# Patient Record
Sex: MALE | Race: WHITE | Employment: UNEMPLOYED | URBAN - METROPOLITAN AREA
[De-identification: names, ages, dates, MRNs, and addresses within clinical notes are randomized per-mention and may not be internally consistent; named-entity substitution may affect disease eponyms.]

---

## 2017-11-27 PROBLEM — K62.5 RECTAL BLEEDING: Status: ACTIVE | Noted: 2017-11-27

## 2018-03-23 ENCOUNTER — OFFICE VISIT (OUTPATIENT)
Dept: FAMILY MEDICINE CLINIC | Age: 23
End: 2018-03-23
Payer: COMMERCIAL

## 2018-03-23 VITALS
BODY MASS INDEX: 31.15 KG/M2 | SYSTOLIC BLOOD PRESSURE: 119 MMHG | RESPIRATION RATE: 18 BRPM | WEIGHT: 230 LBS | OXYGEN SATURATION: 98 % | HEART RATE: 85 BPM | HEIGHT: 72 IN | DIASTOLIC BLOOD PRESSURE: 72 MMHG

## 2018-03-23 DIAGNOSIS — H53.451 PERIPHERAL VISION LOSS, RIGHT: ICD-10-CM

## 2018-03-23 DIAGNOSIS — H10.31 ACUTE CONJUNCTIVITIS OF RIGHT EYE, UNSPECIFIED ACUTE CONJUNCTIVITIS TYPE: Primary | ICD-10-CM

## 2018-03-23 DIAGNOSIS — H57.89 RED EYE: ICD-10-CM

## 2018-03-23 DIAGNOSIS — H53.8 BLURRED VISION, RIGHT EYE: ICD-10-CM

## 2018-03-23 PROCEDURE — G8482 FLU IMMUNIZE ORDER/ADMIN: HCPCS | Performed by: FAMILY MEDICINE

## 2018-03-23 PROCEDURE — G8427 DOCREV CUR MEDS BY ELIG CLIN: HCPCS | Performed by: FAMILY MEDICINE

## 2018-03-23 PROCEDURE — G8417 CALC BMI ABV UP PARAM F/U: HCPCS | Performed by: FAMILY MEDICINE

## 2018-03-23 PROCEDURE — 99213 OFFICE O/P EST LOW 20 MIN: CPT | Performed by: FAMILY MEDICINE

## 2018-03-23 PROCEDURE — 1036F TOBACCO NON-USER: CPT | Performed by: FAMILY MEDICINE

## 2018-03-23 RX ORDER — SULFACETAMIDE SODIUM 100 MG/ML
SOLUTION/ DROPS OPHTHALMIC
Refills: 0 | COMMUNITY
Start: 2018-03-09 | End: 2018-03-23

## 2018-03-23 NOTE — PROGRESS NOTES
Fundoscopic exam:       The right eye shows no arteriolar narrowing. The left eye shows no arteriolar narrowing. Slit lamp exam:       The right eye shows fluorescein uptake. The right eye shows no corneal abrasion, no corneal ulcer and no anterior chamber bulge. The left eye shows no corneal abrasion and no corneal ulcer. Anterior chamber in right eye seemed hazy. Patient does complain of peripheral vision loss. No foreign body noted. Neck: Normal range of motion. Neck supple. Cardiovascular: Normal rate, regular rhythm and normal heart sounds. No murmur heard. Pulmonary/Chest: Effort normal and breath sounds normal. No respiratory distress. He has no wheezes. He has no rales. Abdominal: Soft. Bowel sounds are normal. There is no tenderness. There is no rebound and no guarding. Musculoskeletal: He exhibits no edema, tenderness or deformity. Neurological: He is alert and oriented to person, place, and time. No cranial nerve deficit. Coordination normal.   Skin: Skin is warm and dry. Nursing note and vitals reviewed. Assessment & Plan:  1. Acute conjunctivitis of right eye, unspecified acute conjunctivitis type  Acute  Non-improving  Discontinue sulfacetamide ointment  Only use artificial tears  [facility] I [facility] get patient in appointment established for tomorrow morning at 8:30  Patient instructed to call and tell me how it goes or if anything worsens  Close monitoring  - Christa Stubbs MD (ACE)  - artificial tears (ARTIFICIAL TEARS) [address] [place] [address]; Place . 5 inch of ointment in affected eyelid at night before bed. Dispense: 1 Tube; Refill: 0    2. Blurred vision, right eye  As above  - Christa Stubbs MD (ACE)  - artificial tears (ARTIFICIAL TEARS) [address] [place] [address]; Place . 5 inch of ointment in affected eyelid at night before bed. Dispense: 1 Tube; Refill: 0    3.  Red eye  As above  - [facility]

## 2018-03-25 ASSESSMENT — ENCOUNTER SYMPTOMS
WHEEZING: 0
PHOTOPHOBIA: 1
EYE PAIN: 0
DIARRHEA: 0
COUGH: 0
NAUSEA: 0
EYE ITCHING: 1
SORE THROAT: 0
ABDOMINAL PAIN: 0
SHORTNESS OF BREATH: 0
EYE REDNESS: 1
EYE DISCHARGE: 0
BACK PAIN: 0
VOMITING: 0
SINUS PRESSURE: 0

## 2018-04-02 ENCOUNTER — OFFICE VISIT (OUTPATIENT)
Dept: FAMILY MEDICINE CLINIC | Age: 23
End: 2018-04-02
Payer: COMMERCIAL

## 2018-04-02 VITALS
TEMPERATURE: 98.3 F | DIASTOLIC BLOOD PRESSURE: 78 MMHG | OXYGEN SATURATION: 97 % | BODY MASS INDEX: 31.56 KG/M2 | HEIGHT: 72 IN | RESPIRATION RATE: 16 BRPM | WEIGHT: 233 LBS | HEART RATE: 89 BPM | SYSTOLIC BLOOD PRESSURE: 110 MMHG

## 2018-04-02 DIAGNOSIS — R21 RASH OF FACE: ICD-10-CM

## 2018-04-02 DIAGNOSIS — H10.31 ACUTE CONJUNCTIVITIS OF RIGHT EYE, UNSPECIFIED ACUTE CONJUNCTIVITIS TYPE: Primary | ICD-10-CM

## 2018-04-02 PROCEDURE — 1036F TOBACCO NON-USER: CPT | Performed by: FAMILY MEDICINE

## 2018-04-02 PROCEDURE — G8417 CALC BMI ABV UP PARAM F/U: HCPCS | Performed by: FAMILY MEDICINE

## 2018-04-02 PROCEDURE — 99213 OFFICE O/P EST LOW 20 MIN: CPT | Performed by: FAMILY MEDICINE

## 2018-04-02 PROCEDURE — G8427 DOCREV CUR MEDS BY ELIG CLIN: HCPCS | Performed by: FAMILY MEDICINE

## 2018-04-04 ASSESSMENT — ENCOUNTER SYMPTOMS
ABDOMINAL PAIN: 0
SHORTNESS OF BREATH: 0
EYE REDNESS: 0
VOMITING: 0
WHEEZING: 0
EYE PAIN: 0
SINUS PRESSURE: 0
SORE THROAT: 0
NAUSEA: 0
EYE DISCHARGE: 0
DIARRHEA: 0
COUGH: 0
BACK PAIN: 0

## 2018-05-07 ENCOUNTER — HOSPITAL ENCOUNTER (OUTPATIENT)
Age: 23
Discharge: HOME | End: 2018-05-07
Payer: COMMERCIAL

## 2018-05-07 ENCOUNTER — OFFICE VISIT (OUTPATIENT)
Dept: FAMILY MEDICINE CLINIC | Age: 23
End: 2018-05-07
Payer: COMMERCIAL

## 2018-05-07 VITALS
SYSTOLIC BLOOD PRESSURE: 124 MMHG | HEIGHT: 69 IN | WEIGHT: 230 LBS | DIASTOLIC BLOOD PRESSURE: 70 MMHG | RESPIRATION RATE: 18 BRPM | HEART RATE: 92 BPM | OXYGEN SATURATION: 98 % | BODY MASS INDEX: 34.07 KG/M2

## 2018-05-07 DIAGNOSIS — H20.9 IRITIS OF RIGHT EYE: ICD-10-CM

## 2018-05-07 DIAGNOSIS — H20.9 IRITIS OF RIGHT EYE: Primary | ICD-10-CM

## 2018-05-07 PROBLEM — K62.5 RECTAL BLEEDING: Status: RESOLVED | Noted: 2017-11-27 | Resolved: 2018-05-07

## 2018-05-07 LAB
C-REACTIVE PROTEIN: <0.1 MG/DL (ref 0–0.4)
SEDIMENTATION RATE, ERYTHROCYTE: 7 MM/HR (ref 0–15)

## 2018-05-07 PROCEDURE — G8417 CALC BMI ABV UP PARAM F/U: HCPCS | Performed by: FAMILY MEDICINE

## 2018-05-07 PROCEDURE — 1036F TOBACCO NON-USER: CPT | Performed by: FAMILY MEDICINE

## 2018-05-07 PROCEDURE — 99213 OFFICE O/P EST LOW 20 MIN: CPT | Performed by: FAMILY MEDICINE

## 2018-05-07 PROCEDURE — 86592 SYPHILIS TEST NON-TREP QUAL: CPT

## 2018-05-07 PROCEDURE — 86038 ANTINUCLEAR ANTIBODIES: CPT

## 2018-05-07 PROCEDURE — 85651 RBC SED RATE NONAUTOMATED: CPT

## 2018-05-07 PROCEDURE — 36415 COLL VENOUS BLD VENIPUNCTURE: CPT

## 2018-05-07 PROCEDURE — 86140 C-REACTIVE PROTEIN: CPT

## 2018-05-07 PROCEDURE — 86812 HLA TYPING A B OR C: CPT

## 2018-05-07 PROCEDURE — G8427 DOCREV CUR MEDS BY ELIG CLIN: HCPCS | Performed by: FAMILY MEDICINE

## 2018-05-07 ASSESSMENT — ENCOUNTER SYMPTOMS
PHOTOPHOBIA: 1
EYE PAIN: 1
COUGH: 0
EYE REDNESS: 1
ABDOMINAL PAIN: 0
SORE THROAT: 0
VOMITING: 0
NAUSEA: 0
SHORTNESS OF BREATH: 0
WHEEZING: 0
EYE DISCHARGE: 0
SINUS PRESSURE: 0
BACK PAIN: 0
DIARRHEA: 0

## 2018-05-08 LAB — RPR: NORMAL

## 2018-05-09 LAB
ANTI-NUCLEAR ANTIBODY (ANA): NEGATIVE
HLA B27: NEGATIVE

## 2018-05-19 ENCOUNTER — HOSPITAL ENCOUNTER (OUTPATIENT)
Dept: MRI IMAGING | Age: 23
Discharge: HOME | End: 2018-05-21
Payer: COMMERCIAL

## 2018-05-19 DIAGNOSIS — H44.111 PANUVEITIS OF RIGHT EYE: ICD-10-CM

## 2018-05-19 PROCEDURE — 6360000004 HC RX CONTRAST MEDICATION: Performed by: RADIOLOGY

## 2018-05-19 PROCEDURE — A9579 GAD-BASE MR CONTRAST NOS,1ML: HCPCS | Performed by: RADIOLOGY

## 2018-05-19 PROCEDURE — 70543 MRI ORBT/FAC/NCK W/O &W/DYE: CPT

## 2018-05-19 RX ADMIN — GADOTERIDOL 20 ML: 279.3 INJECTION, SOLUTION INTRAVENOUS at 11:04

## 2018-05-23 ENCOUNTER — OFFICE VISIT (OUTPATIENT)
Dept: FAMILY MEDICINE CLINIC | Age: 23
End: 2018-05-23
Payer: COMMERCIAL

## 2018-05-23 VITALS
HEIGHT: 69 IN | SYSTOLIC BLOOD PRESSURE: 119 MMHG | RESPIRATION RATE: 16 BRPM | DIASTOLIC BLOOD PRESSURE: 64 MMHG | WEIGHT: 230 LBS | BODY MASS INDEX: 34.07 KG/M2 | HEART RATE: 71 BPM

## 2018-05-23 DIAGNOSIS — H20.9 IRITIS: Primary | ICD-10-CM

## 2018-05-23 DIAGNOSIS — R21 RASH: ICD-10-CM

## 2018-05-23 PROCEDURE — G8427 DOCREV CUR MEDS BY ELIG CLIN: HCPCS | Performed by: FAMILY MEDICINE

## 2018-05-23 PROCEDURE — G8417 CALC BMI ABV UP PARAM F/U: HCPCS | Performed by: FAMILY MEDICINE

## 2018-05-23 PROCEDURE — 99213 OFFICE O/P EST LOW 20 MIN: CPT | Performed by: FAMILY MEDICINE

## 2018-05-23 PROCEDURE — 1036F TOBACCO NON-USER: CPT | Performed by: FAMILY MEDICINE

## 2018-05-23 ASSESSMENT — ENCOUNTER SYMPTOMS
SINUS PRESSURE: 0
DIARRHEA: 0
EYE DISCHARGE: 0
SORE THROAT: 0
EYE PAIN: 0
WHEEZING: 0
BACK PAIN: 0
ABDOMINAL PAIN: 0
EYE REDNESS: 0
COUGH: 0
NAUSEA: 0
VOMITING: 0
SHORTNESS OF BREATH: 0

## 2018-08-06 RX ORDER — TRAZODONE HYDROCHLORIDE 50 MG/1
50 TABLET ORAL
Status: CANCELLED | OUTPATIENT
Start: 2018-08-06

## 2018-08-17 RX ORDER — TRAZODONE HYDROCHLORIDE 50 MG/1
50 TABLET ORAL NIGHTLY
Qty: 30 TABLET | Refills: 0 | Status: ON HOLD | OUTPATIENT
Start: 2018-08-17 | End: 2018-11-20 | Stop reason: HOSPADM

## 2018-08-23 ENCOUNTER — OFFICE VISIT (OUTPATIENT)
Dept: FAMILY MEDICINE CLINIC | Age: 23
End: 2018-08-23
Payer: COMMERCIAL

## 2018-08-23 VITALS
BODY MASS INDEX: 35.99 KG/M2 | HEART RATE: 88 BPM | RESPIRATION RATE: 18 BRPM | DIASTOLIC BLOOD PRESSURE: 68 MMHG | OXYGEN SATURATION: 97 % | HEIGHT: 69 IN | WEIGHT: 243 LBS | SYSTOLIC BLOOD PRESSURE: 122 MMHG

## 2018-08-23 DIAGNOSIS — K21.9 GASTROESOPHAGEAL REFLUX DISEASE, ESOPHAGITIS PRESENCE NOT SPECIFIED: ICD-10-CM

## 2018-08-23 DIAGNOSIS — R07.9 CHEST PAIN, UNSPECIFIED TYPE: Primary | ICD-10-CM

## 2018-08-23 PROCEDURE — G8417 CALC BMI ABV UP PARAM F/U: HCPCS | Performed by: FAMILY MEDICINE

## 2018-08-23 PROCEDURE — G8427 DOCREV CUR MEDS BY ELIG CLIN: HCPCS | Performed by: FAMILY MEDICINE

## 2018-08-23 PROCEDURE — 1036F TOBACCO NON-USER: CPT | Performed by: FAMILY MEDICINE

## 2018-08-23 PROCEDURE — 93000 ELECTROCARDIOGRAM COMPLETE: CPT | Performed by: FAMILY MEDICINE

## 2018-08-23 PROCEDURE — 99213 OFFICE O/P EST LOW 20 MIN: CPT | Performed by: FAMILY MEDICINE

## 2018-08-23 RX ORDER — VALACYCLOVIR HYDROCHLORIDE 1 G/1
1000 TABLET, FILM COATED ORAL DAILY
COMMUNITY
End: 2019-03-11

## 2018-08-23 RX ORDER — OMEPRAZOLE 20 MG/1
20 CAPSULE, DELAYED RELEASE ORAL
Qty: 30 CAPSULE | Refills: 0 | Status: SHIPPED | OUTPATIENT
Start: 2018-08-23 | End: 2018-11-15

## 2018-08-23 ASSESSMENT — ENCOUNTER SYMPTOMS
BACK PAIN: 0
VOMITING: 0
SORE THROAT: 0
SHORTNESS OF BREATH: 0
EYE DISCHARGE: 0
SINUS PRESSURE: 0
EYE REDNESS: 0
ABDOMINAL PAIN: 0
DIARRHEA: 0
COUGH: 0
WHEEZING: 0
NAUSEA: 0
EYE PAIN: 0

## 2018-08-23 NOTE — PROGRESS NOTES
breath sounds normal. No respiratory distress. He has no wheezes. He has no rales. He exhibits tenderness. Reproducible Left sided chest pain      Abdominal: Soft. Bowel sounds are normal. There is no tenderness. There is no rebound and no guarding. Musculoskeletal: He exhibits no edema, tenderness or deformity. Neurological: He is alert and oriented to person, place, and time. No cranial nerve deficit. Coordination normal.   Skin: Skin is warm and dry. Nursing note and vitals reviewed. Assessment & Plan:    1. Gastroesophageal reflux disease, esophagitis presence not specified  Not symptomatic at all   Discontinue Prilosec. Gave patient instructions on long taper of medication  - omeprazole (PRILOSEC) 20 MG delayed release capsule; Take 1 capsule by mouth daily (with breakfast)  Dispense: 30 capsule; Refill: 0    2. Chest pain, unspecified type  EKG done in office today   Making sure that chest pain is not cardiac origin   It does not show that it is cardiac in origin   Most likely musculoskeletal    - EKG 12 Lead; Future  - EKG 12 Lead    Dr. Sarah Glover --> spoke to opthalmalogic specialist from [address] [place] [address]. Poor prognosis. Has follow up with them in 2 weeks. As above. Call or go to ED immediately if symptoms worsen or persist.  Return in about 3 months (around 11/23/2018). , or sooner if necessary. Counseled regarding above diagnosis, including possible risks and complications,  especially if left uncontrolled. Counseled regarding the possible side effects, risks, benefits and alternatives to treatment; patient and/or guardian verbalizes understanding, agrees, feels comfortable with and wishes to proceed with above treatment plan. Advised patient to call with any new medication issues, and read all Rx info from pharmacy to assure aware of all possible risks and side effects of medication before taking.     Reviewed age and gender appropriate health screening exams and vaccinations. Advised patient regarding importance of keeping up with recommended health maintenance and to schedule as soon as possible if overdue, as this is important in assessing for undiagnosed pathology, especially cancer, as well as protecting against potentially harmful/life threatening disease. Patient and/or guardian verbalizes understanding and agrees with above counseling, assessment and plan. All questions answered.

## 2018-08-23 NOTE — PROGRESS NOTES
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 He has lost considerable vision on the right. ROS: Otherwise negative    Patient Active Problem List   Diagnosis    Iritis of right eye       Past medical, surgical, family and social history were reviewed, non-contributory, and unchanged unless otherwise stated. Objective:    /68   Pulse 88   Resp 18   Ht 5' 9\" (1.753 m)   Wt 243 lb (110.2 kg)   SpO2 97%   BMI 35.88 kg/m²     Exam is as noted by resident with the following changes, additions or corrections:      Assessment/Plan:        Latisha Bartholomew was seen today for conjunctivitis. Diagnoses and all orders for this visit:    Chest pain, unspecified type  -     EKG 12 Lead; Future  -     EKG 12 Lead    Gastroesophageal reflux disease, esophagitis presence not specified  -     omeprazole (PRILOSEC) 20 MG delayed release capsule; Take 1 capsule by mouth daily (with breakfast)    Other orders  -     EKG 12 Lead; Standing  -     EKG 12 Lead           Attending Physician Statement    I have reviewed the chart, including any radiology or labs. I have discussed the case, including pertinent history and exam findings with the resident. I agree with the assessment, plan and orders as documented by the resident. Please refer to the resident note for additional information.       Electronically signed by Jazmine Lindsay DO on 8/23/2018 at 8:40 PM

## 2018-08-24 ENCOUNTER — TELEPHONE (OUTPATIENT)
Dept: FAMILY MEDICINE CLINIC | Age: 23
End: 2018-08-24

## 2018-08-24 NOTE — TELEPHONE ENCOUNTER
Spoke to care provider. It is my recommendation that the patient call the eye specialist that they are seeing in [facility] clinic. The care provider understood and stated that they will go that way as soon as our conversation ended.      Thank you,      Robin Deleon  4:23 PM  08/24/18

## 2018-08-24 NOTE — TELEPHONE ENCOUNTER
Patients guardian called to let you know that Michael's good eye started itching and red.   He was not sure what you wanted him to do? thanks

## 2018-11-15 ENCOUNTER — OFFICE VISIT (OUTPATIENT)
Dept: FAMILY MEDICINE CLINIC | Age: 23
End: 2018-11-15
Payer: COMMERCIAL

## 2018-11-15 ENCOUNTER — HOSPITAL ENCOUNTER (INPATIENT)
Age: 23
LOS: 5 days | Discharge: HOME | DRG: 753 | End: 2018-11-20
Attending: EMERGENCY MEDICINE | Admitting: PSYCHIATRY & NEUROLOGY
Payer: COMMERCIAL

## 2018-11-15 VITALS
BODY MASS INDEX: 35.84 KG/M2 | SYSTOLIC BLOOD PRESSURE: 124 MMHG | DIASTOLIC BLOOD PRESSURE: 82 MMHG | WEIGHT: 242 LBS | HEIGHT: 69 IN | TEMPERATURE: 97.9 F | HEART RATE: 80 BPM | OXYGEN SATURATION: 97 %

## 2018-11-15 DIAGNOSIS — F32.A DEPRESSION WITH SUICIDAL IDEATION: Primary | ICD-10-CM

## 2018-11-15 DIAGNOSIS — R45.851 DEPRESSION WITH SUICIDAL IDEATION: Primary | ICD-10-CM

## 2018-11-15 DIAGNOSIS — R45.851 SUICIDAL INTENT: Primary | ICD-10-CM

## 2018-11-15 PROBLEM — F32.3 SEVERE MAJOR DEPRESSION WITH PSYCHOTIC FEATURES (HCC): Status: ACTIVE | Noted: 2018-11-15

## 2018-11-15 LAB
ACETAMINOPHEN LEVEL: <5 MCG/ML (ref 10–30)
ALBUMIN SERPL-MCNC: 4.2 G/DL (ref 3.5–5.2)
ALP BLD-CCNC: 84 U/L (ref 40–129)
ALT SERPL-CCNC: 7 U/L (ref 0–40)
AMPHETAMINE SCREEN, URINE: NOT DETECTED
ANION GAP SERPL CALCULATED.3IONS-SCNC: 13 MMOL/L (ref 7–16)
AST SERPL-CCNC: 12 U/L (ref 0–39)
BARBITURATE SCREEN URINE: NOT DETECTED
BASOPHILS ABSOLUTE: 0.02 E9/L (ref 0–0.2)
BASOPHILS RELATIVE PERCENT: 0.2 % (ref 0–2)
BENZODIAZEPINE SCREEN, URINE: NOT DETECTED
BILIRUB SERPL-MCNC: 0.3 MG/DL (ref 0–1.2)
BILIRUBIN URINE: NEGATIVE
BLOOD, URINE: NEGATIVE
BUN BLDV-MCNC: 11 MG/DL (ref 6–20)
CALCIUM SERPL-MCNC: 8.8 MG/DL (ref 8.6–10.2)
CANNABINOID SCREEN URINE: NOT DETECTED
CHLORIDE BLD-SCNC: 102 MMOL/L (ref 98–107)
CLARITY: CLEAR
CO2: 25 MMOL/L (ref 22–29)
COCAINE METABOLITE SCREEN URINE: NOT DETECTED
COLOR: YELLOW
CREAT SERPL-MCNC: 0.7 MG/DL (ref 0.7–1.2)
EKG ATRIAL RATE: 60 BPM
EKG P AXIS: 19 DEGREES
EKG P-R INTERVAL: 150 MS
EKG Q-T INTERVAL: 408 MS
EKG QRS DURATION: 98 MS
EKG QTC CALCULATION (BAZETT): 408 MS
EKG R AXIS: 45 DEGREES
EKG T AXIS: 35 DEGREES
EKG VENTRICULAR RATE: 60 BPM
EOSINOPHILS ABSOLUTE: 0.09 E9/L (ref 0.05–0.5)
EOSINOPHILS RELATIVE PERCENT: 1.1 % (ref 0–6)
ETHANOL: <10 MG/DL (ref 0–0.08)
GFR AFRICAN AMERICAN: >60
GFR NON-AFRICAN AMERICAN: >60 ML/MIN/1.73
GLUCOSE BLD-MCNC: 88 MG/DL (ref 74–99)
GLUCOSE URINE: NEGATIVE MG/DL
HCT VFR BLD CALC: 43.3 % (ref 37–54)
HEMOGLOBIN: 14.4 G/DL (ref 12.5–16.5)
IMMATURE GRANULOCYTES #: 0.04 E9/L
IMMATURE GRANULOCYTES %: 0.5 % (ref 0–5)
KETONES, URINE: NEGATIVE MG/DL
LEUKOCYTE ESTERASE, URINE: NEGATIVE
LYMPHOCYTES ABSOLUTE: 2.2 E9/L (ref 1.5–4)
LYMPHOCYTES RELATIVE PERCENT: 27 % (ref 20–42)
MCH RBC QN AUTO: 31.6 PG (ref 26–35)
MCHC RBC AUTO-ENTMCNC: 33.3 % (ref 32–34.5)
MCV RBC AUTO: 95 FL (ref 80–99.9)
METHADONE SCREEN, URINE: NOT DETECTED
MONOCYTES ABSOLUTE: 0.38 E9/L (ref 0.1–0.95)
MONOCYTES RELATIVE PERCENT: 4.7 % (ref 2–12)
NEUTROPHILS ABSOLUTE: 5.41 E9/L (ref 1.8–7.3)
NEUTROPHILS RELATIVE PERCENT: 66.5 % (ref 43–80)
NITRITE, URINE: NEGATIVE
OPIATE SCREEN URINE: NOT DETECTED
PDW BLD-RTO: 11.9 FL (ref 11.5–15)
PH UA: 7 (ref 5–9)
PHENCYCLIDINE SCREEN URINE: NOT DETECTED
PLATELET # BLD: 305 E9/L (ref 130–450)
PMV BLD AUTO: 8.8 FL (ref 7–12)
POTASSIUM SERPL-SCNC: 4.2 MMOL/L (ref 3.5–5)
PROPOXYPHENE SCREEN: NOT DETECTED
PROTEIN UA: NEGATIVE MG/DL
RBC # BLD: 4.56 E12/L (ref 3.8–5.8)
SALICYLATE, SERUM: <0.3 MG/DL (ref 0–30)
SODIUM BLD-SCNC: 140 MMOL/L (ref 132–146)
SPECIFIC GRAVITY UA: <=1.005 (ref 1–1.03)
TOTAL PROTEIN: 7.4 G/DL (ref 6.4–8.3)
TRICYCLIC ANTIDEPRESSANTS SCREEN SERUM: NEGATIVE NG/ML
UROBILINOGEN, URINE: 0.2 E.U./DL
VALPROIC ACID LEVEL: 66 MCG/ML (ref 50–100)
WBC # BLD: 8.1 E9/L (ref 4.5–11.5)

## 2018-11-15 PROCEDURE — 6370000000 HC RX 637 (ALT 250 FOR IP): Performed by: NURSE PRACTITIONER

## 2018-11-15 PROCEDURE — 80164 ASSAY DIPROPYLACETIC ACD TOT: CPT

## 2018-11-15 PROCEDURE — 99285 EMERGENCY DEPT VISIT HI MDM: CPT

## 2018-11-15 PROCEDURE — G0480 DRUG TEST DEF 1-7 CLASSES: HCPCS

## 2018-11-15 PROCEDURE — 81003 URINALYSIS AUTO W/O SCOPE: CPT

## 2018-11-15 PROCEDURE — 85025 COMPLETE CBC W/AUTO DIFF WBC: CPT

## 2018-11-15 PROCEDURE — 80053 COMPREHEN METABOLIC PANEL: CPT

## 2018-11-15 PROCEDURE — 80307 DRUG TEST PRSMV CHEM ANLYZR: CPT

## 2018-11-15 PROCEDURE — 99213 OFFICE O/P EST LOW 20 MIN: CPT | Performed by: FAMILY MEDICINE

## 2018-11-15 PROCEDURE — 1036F TOBACCO NON-USER: CPT | Performed by: FAMILY MEDICINE

## 2018-11-15 PROCEDURE — G8427 DOCREV CUR MEDS BY ELIG CLIN: HCPCS | Performed by: FAMILY MEDICINE

## 2018-11-15 PROCEDURE — 1240000000 HC EMOTIONAL WELLNESS R&B

## 2018-11-15 PROCEDURE — G8417 CALC BMI ABV UP PARAM F/U: HCPCS | Performed by: FAMILY MEDICINE

## 2018-11-15 PROCEDURE — G8484 FLU IMMUNIZE NO ADMIN: HCPCS | Performed by: FAMILY MEDICINE

## 2018-11-15 PROCEDURE — 36415 COLL VENOUS BLD VENIPUNCTURE: CPT

## 2018-11-15 RX ORDER — DIVALPROEX SODIUM 250 MG/1
250 TABLET, DELAYED RELEASE ORAL 2 TIMES DAILY
Status: DISCONTINUED | OUTPATIENT
Start: 2018-11-15 | End: 2018-11-19

## 2018-11-15 RX ORDER — MIRTAZAPINE 30 MG/1
30 TABLET, FILM COATED ORAL NIGHTLY
Status: ON HOLD | COMMUNITY
End: 2019-01-28 | Stop reason: HOSPADM

## 2018-11-15 RX ORDER — OLANZAPINE 10 MG/1
10 TABLET ORAL
Status: ACTIVE | OUTPATIENT
Start: 2018-11-15 | End: 2018-11-15

## 2018-11-15 RX ORDER — MINERAL OIL AND WHITE PETROLATUM 150; 830 MG/G; MG/G
OINTMENT OPHTHALMIC PRN
Status: DISCONTINUED | OUTPATIENT
Start: 2018-11-15 | End: 2018-11-20 | Stop reason: HOSPADM

## 2018-11-15 RX ORDER — MAGNESIUM HYDROXIDE/ALUMINUM HYDROXICE/SIMETHICONE 120; 1200; 1200 MG/30ML; MG/30ML; MG/30ML
30 SUSPENSION ORAL PRN
Status: DISCONTINUED | OUTPATIENT
Start: 2018-11-15 | End: 2018-11-20 | Stop reason: HOSPADM

## 2018-11-15 RX ORDER — HYDROXYZINE PAMOATE 50 MG/1
50 CAPSULE ORAL EVERY 6 HOURS PRN
Status: DISCONTINUED | OUTPATIENT
Start: 2018-11-15 | End: 2018-11-20 | Stop reason: HOSPADM

## 2018-11-15 RX ORDER — ARIPIPRAZOLE 15 MG/1
30 TABLET ORAL DAILY
Status: DISCONTINUED | OUTPATIENT
Start: 2018-11-15 | End: 2018-11-19

## 2018-11-15 RX ORDER — HALOPERIDOL 5 MG/ML
10 INJECTION INTRAMUSCULAR EVERY 6 HOURS PRN
Status: DISCONTINUED | OUTPATIENT
Start: 2018-11-15 | End: 2018-11-20 | Stop reason: HOSPADM

## 2018-11-15 RX ORDER — DOCUSATE SODIUM 100 MG/1
100 CAPSULE, LIQUID FILLED ORAL DAILY PRN
Status: DISCONTINUED | OUTPATIENT
Start: 2018-11-15 | End: 2018-11-20 | Stop reason: HOSPADM

## 2018-11-15 RX ORDER — TRAZODONE HYDROCHLORIDE 50 MG/1
50 TABLET ORAL NIGHTLY PRN
Status: DISCONTINUED | OUTPATIENT
Start: 2018-11-15 | End: 2018-11-20 | Stop reason: HOSPADM

## 2018-11-15 RX ORDER — CLONAZEPAM 0.5 MG/1
0.5 TABLET ORAL 3 TIMES DAILY PRN
Status: DISCONTINUED | OUTPATIENT
Start: 2018-11-15 | End: 2018-11-20 | Stop reason: HOSPADM

## 2018-11-15 RX ORDER — MIRTAZAPINE 15 MG/1
30 TABLET, FILM COATED ORAL NIGHTLY
Status: DISCONTINUED | OUTPATIENT
Start: 2018-11-15 | End: 2018-11-20 | Stop reason: HOSPADM

## 2018-11-15 RX ORDER — DIVALPROEX SODIUM 500 MG/1
500 TABLET, DELAYED RELEASE ORAL 2 TIMES DAILY
Status: DISCONTINUED | OUTPATIENT
Start: 2018-11-15 | End: 2018-11-16

## 2018-11-15 RX ORDER — FLUTICASONE PROPIONATE 50 MCG
1 SPRAY, SUSPENSION (ML) NASAL DAILY
Status: DISCONTINUED | OUTPATIENT
Start: 2018-11-15 | End: 2018-11-20 | Stop reason: HOSPADM

## 2018-11-15 RX ORDER — ARIPIPRAZOLE 30 MG/1
30 TABLET ORAL DAILY
Status: ON HOLD | COMMUNITY
End: 2018-11-20 | Stop reason: HOSPADM

## 2018-11-15 RX ORDER — ACYCLOVIR 800 MG/1
800 TABLET ORAL
Status: DISCONTINUED | OUTPATIENT
Start: 2018-11-15 | End: 2018-11-20 | Stop reason: HOSPADM

## 2018-11-15 RX ORDER — BENZTROPINE MESYLATE 1 MG/ML
2 INJECTION INTRAMUSCULAR; INTRAVENOUS 2 TIMES DAILY PRN
Status: DISCONTINUED | OUTPATIENT
Start: 2018-11-15 | End: 2018-11-20 | Stop reason: HOSPADM

## 2018-11-15 RX ORDER — ACETAMINOPHEN 325 MG/1
650 TABLET ORAL EVERY 4 HOURS PRN
Status: DISCONTINUED | OUTPATIENT
Start: 2018-11-15 | End: 2018-11-20 | Stop reason: HOSPADM

## 2018-11-15 RX ADMIN — DIVALPROEX SODIUM 250 MG: 250 TABLET, DELAYED RELEASE ORAL at 21:01

## 2018-11-15 RX ADMIN — FLUTICASONE PROPIONATE 1 SPRAY: 50 SPRAY, METERED NASAL at 18:12

## 2018-11-15 RX ADMIN — CLONAZEPAM 0.5 MG: 0.5 TABLET ORAL at 20:59

## 2018-11-15 RX ADMIN — HYDROXYZINE PAMOATE 50 MG: 50 CAPSULE ORAL at 20:59

## 2018-11-15 RX ADMIN — ARIPIPRAZOLE 30 MG: 15 TABLET ORAL at 18:12

## 2018-11-15 RX ADMIN — ACYCLOVIR 800 MG: 800 TABLET ORAL at 18:12

## 2018-11-15 RX ADMIN — MIRTAZAPINE 30 MG: 15 TABLET, FILM COATED ORAL at 20:59

## 2018-11-15 ASSESSMENT — SLEEP AND FATIGUE QUESTIONNAIRES
DO YOU HAVE DIFFICULTY SLEEPING: YES
RESTFUL SLEEP: NO
DIFFICULTY ARISING: YES
AVERAGE NUMBER OF SLEEP HOURS: 5
DO YOU USE A SLEEP AID: YES
DIFFICULTY FALLING ASLEEP: YES
SLEEP PATTERN: DISTURBED/INTERRUPTED SLEEP;NIGHTMARES/TERRORS
DIFFICULTY STAYING ASLEEP: YES

## 2018-11-15 ASSESSMENT — LIFESTYLE VARIABLES: HISTORY_ALCOHOL_USE: NO

## 2018-11-15 ASSESSMENT — PATIENT HEALTH QUESTIONNAIRE - PHQ9: SUM OF ALL RESPONSES TO PHQ QUESTIONS 1-9: 12

## 2018-11-16 PROBLEM — F31.4 BIPOLAR 1 DISORDER, DEPRESSED, SEVERE (HCC): Status: ACTIVE | Noted: 2018-11-16

## 2018-11-16 LAB
CHOLESTEROL, TOTAL: 144 MG/DL (ref 0–199)
HBA1C MFR BLD: 5.5 % (ref 4–5.6)
HDLC SERPL-MCNC: 41 MG/DL
LDL CHOLESTEROL CALCULATED: 75 MG/DL (ref 0–99)
TRIGL SERPL-MCNC: 141 MG/DL (ref 0–149)
VALPROIC ACID LEVEL: 42 MCG/ML (ref 50–100)
VLDLC SERPL CALC-MCNC: 28 MG/DL

## 2018-11-16 PROCEDURE — 36415 COLL VENOUS BLD VENIPUNCTURE: CPT

## 2018-11-16 PROCEDURE — 1240000000 HC EMOTIONAL WELLNESS R&B

## 2018-11-16 PROCEDURE — 99222 1ST HOSP IP/OBS MODERATE 55: CPT | Performed by: NURSE PRACTITIONER

## 2018-11-16 PROCEDURE — 6370000000 HC RX 637 (ALT 250 FOR IP): Performed by: NURSE PRACTITIONER

## 2018-11-16 PROCEDURE — 80164 ASSAY DIPROPYLACETIC ACD TOT: CPT

## 2018-11-16 PROCEDURE — 80061 LIPID PANEL: CPT

## 2018-11-16 PROCEDURE — 83036 HEMOGLOBIN GLYCOSYLATED A1C: CPT

## 2018-11-16 RX ADMIN — ACYCLOVIR 800 MG: 800 TABLET ORAL at 15:03

## 2018-11-16 RX ADMIN — CLONAZEPAM 0.5 MG: 0.5 TABLET ORAL at 20:19

## 2018-11-16 RX ADMIN — ACYCLOVIR 800 MG: 800 TABLET ORAL at 18:49

## 2018-11-16 RX ADMIN — ARIPIPRAZOLE 30 MG: 15 TABLET ORAL at 12:04

## 2018-11-16 RX ADMIN — ACETAMINOPHEN 650 MG: 325 TABLET, FILM COATED ORAL at 05:05

## 2018-11-16 RX ADMIN — ACYCLOVIR 800 MG: 800 TABLET ORAL at 07:08

## 2018-11-16 RX ADMIN — FLUTICASONE PROPIONATE 1 SPRAY: 50 SPRAY, METERED NASAL at 12:04

## 2018-11-16 RX ADMIN — ACYCLOVIR 800 MG: 800 TABLET ORAL at 22:18

## 2018-11-16 RX ADMIN — DIVALPROEX SODIUM 250 MG: 250 TABLET, DELAYED RELEASE ORAL at 12:04

## 2018-11-16 RX ADMIN — HYDROCORTISONE: 25 CREAM TOPICAL at 12:11

## 2018-11-16 RX ADMIN — DIVALPROEX SODIUM 250 MG: 250 TABLET, DELAYED RELEASE ORAL at 20:19

## 2018-11-16 RX ADMIN — ACYCLOVIR 800 MG: 800 TABLET ORAL at 12:12

## 2018-11-16 RX ADMIN — MIRTAZAPINE 30 MG: 15 TABLET, FILM COATED ORAL at 20:19

## 2018-11-16 ASSESSMENT — ENCOUNTER SYMPTOMS
SINUS PRESSURE: 0
COUGH: 0
DIARRHEA: 0
ABDOMINAL PAIN: 0
EYE DISCHARGE: 0
BACK PAIN: 0
SORE THROAT: 0
SHORTNESS OF BREATH: 0
EYE REDNESS: 0
WHEEZING: 0
EYE PAIN: 0
NAUSEA: 0
VOMITING: 0

## 2018-11-16 ASSESSMENT — SLEEP AND FATIGUE QUESTIONNAIRES
DIFFICULTY STAYING ASLEEP: YES
DO YOU HAVE DIFFICULTY SLEEPING: YES
DIFFICULTY ARISING: YES
DIFFICULTY FALLING ASLEEP: YES
AVERAGE NUMBER OF SLEEP HOURS: 5
DO YOU USE A SLEEP AID: YES
RESTFUL SLEEP: NO

## 2018-11-16 ASSESSMENT — PATIENT HEALTH QUESTIONNAIRE - PHQ9: SUM OF ALL RESPONSES TO PHQ QUESTIONS 1-9: 12

## 2018-11-16 ASSESSMENT — PAIN SCALES - GENERAL
PAINLEVEL_OUTOF10: 5
PAINLEVEL_OUTOF10: 0

## 2018-11-16 ASSESSMENT — LIFESTYLE VARIABLES: HISTORY_ALCOHOL_USE: NO

## 2018-11-16 NOTE — PROGRESS NOTES
Group Therapy Note     Date: 11/16/2018  Start Time: 200  End Time:  542  Number of Participants: 5     Type of Group: Cognitive Skills     Wellness Binder Information  Module Name:  n/a  Session Number:  n/a     Patient's Goal:  Pt will be able to identify negative consequences of negative core beliefs; and to be able to identify ways to change those negative beliefs.      Notes:  Pt was active in class discussion was able to verbalize positive qualities and provide positive affirmations that coincide with daily life motivations.     Status After Intervention:  Improved     Participation Level:  Active Listener and Interactive     Participation Quality: Appropriate, Attentive, Sharing and Supportive     Speech:  normal     Thought Process/Content: Logical     Affective Functioning: Blunted     Mood:Euthymic     Level of consciousness:  Alert, Oriented x4 and Attentive     Response to Learning: Able to verbalize current knowledge/experience, Able to verbalize/acknowledge new learning and Progressing to goal     Endings: None Reported     Modes of Intervention: Education, Support, Socialization, Exploration and Problem-solving     Discipline Responsible: /Counselor

## 2018-11-16 NOTE — ED PROVIDER NOTES
Department of Emergency Medicine   ED  Provider Note  Admit Date/RoomTime: 11/15/2018 12:18 PM  ED Room: 4576/2130-M  Chief Complaint   Suicidal (plan to hang self admits to auditory hallucinations pt resides in [facility] for same plan requesting not to go back to [facility])    History of Present Illness   Source of history provided by:  patient and caregiver / friend. History/Exam Limitations: none. Tyler Epsinosa is a 25 y.o. old male who has a past medical history of:   Past Medical History:   Diagnosis Date    ADHD     Anxiety     Depression     Fetal alcohol syndrome     high functioning, able to read and write, ambulatory, cooperative    Seizure disorder ([facility] [address] 2010    none recent    Tourettes syndrome      Patient with history of anxiety and depression presents from a group home. He states that he has auditory hallucinations and suicidal ideation. He plans to hang himself. He has had suicide attempts in the past.    Quality:      Hopelessness:   Yes. Terror:   No.     Confusion:   No.     Hallucinations:   auditory. Able to care for self: No.  Able to control self: No.    ROS    Pertinent positives and negatives are stated within HPI, all other systems reviewed and are negative. Past Surgical History:  has a past surgical history that includes Cleft palate repair; Cleft lip repair; Colonoscopy (2017); and Endoscopy, colon, diagnostic (2017). Social History:  reports that he has quit smoking. He has never used smokeless tobacco. He reports that he does not drink alcohol or use drugs. Family History: family history includes No Known Problems in his father, mother, sister, and sister. Allergies: Patient has no known allergies.     Physical Exam           ED Triage Vitals   BP Temp Temp Source Pulse Resp SpO2 Height Weight   11/15/18 1229 11/15/18 1229 11/15/18 1229 11/15/18 1229 11/15/18 1229 11/15/18 1229 11/15/18 1732 11/15/18 1732

## 2018-11-16 NOTE — H&P
Facility-Administered Medications: ARIPiprazole (ABILIFY) tablet 30 mg, 30 mg, Oral, Daily  lubrifresh P.M. (artificial tears) ophthalmic ointment, , Ophthalmic, PRN  divalproex (DEPAKOTE) DR tablet 250 mg, 250 mg, Oral, BID  clonazePAM (KLONOPIN) tablet 0.5 mg, 0.5 mg, Oral, TID PRN  docusate sodium (COLACE) capsule 100 mg, 100 mg, Oral, Daily PRN  fluticasone (FLONASE) 50 MCG/ACT nasal spray 1 spray, 1 spray, Nasal, Daily  hydrocortisone 2.5 % cream, , Topical, BID  mirtazapine (REMERON) tablet 30 mg, 30 mg, Oral, Nightly  acyclovir (ZOVIRAX) tablet 800 mg, 800 mg, Oral, 5x Daily  acetaminophen (TYLENOL) tablet 650 mg, 650 mg, Oral, Q4H PRN  hydrOXYzine (VISTARIL) capsule 50 mg, 50 mg, Oral, Q6H PRN  haloperidol lactate (HALDOL) injection 10 mg, 10 mg, Intramuscular, Q6H PRN  traZODone (DESYREL) tablet 50 mg, 50 mg, Oral, Nightly PRN  benztropine mesylate (COGENTIN) injection 2 mg, 2 mg, Intramuscular, BID PRN  magnesium hydroxide (MILK OF MAGNESIA) 400 MG/5ML suspension 30 mL, 30 mL, Oral, Daily PRN  aluminum & magnesium hydroxide-simethicone (MAALOX) 200-200-20 MG/5ML suspension 30 mL, 30 mL, Oral, PRN  influenza quadrivalent split vaccine (FLUZONE;FLUARIX;FLULAVAL;AFLURIA) injection 0.5 mL, 0.5 mL, Intramuscular, Prior to discharge    Medical Review of Systems:     All other than marked systmes have been reviewed and are all negative.     Constitutional Symptoms: []  fever []  Chills  Skin Symptoms: [] rash []  Pruritus   Eye Symptoms: [] Vision unchanged []  recent vision problems[] blurred vision   Respiratory Symptoms:[] shortness of breath [] cough  Cardiovascular Symptoms:  [] chest pain   [] palpitations   Gastrointestinal Symptoms: []  abdominal pain []  nausea []  vomiting []  diarrhea  Genitourinary Symptoms: []  dysuria  []  hematuria   Musculoskeletal Symptoms: []  back pain []  muscle pain []  joint pain  Neurologic Symptoms: []  headache []  dizziness  Hematolymphoid Symptoms: [] Adenopathy [] Bruises   [] Schimosis       VITALS: BP (!) 91/59   Pulse 85   Temp 97.5 °F (36.4 °C) (Oral)   Resp 16   Ht 5' 9\" (1.753 m)   Wt 260 lb (117.9 kg)   SpO2 96%   BMI 38.40 kg/m²     ALLERGIES: Patient has no known allergies.             Physical Examination:    Head:  [x] Atraumatic:  [x] normocephalic  Skin and Mucosa       [] Moist [] Dry [] Pale [x] Normal   Neck: [x] Thyroid [] Palpable    [x] Not palpable []  venus distention [] adenopathy   Chest: [x] Clear [] Rhonchi  [] Wheezing   CV: [x] S1 [x] S2 [] No murmer   Abdomen:  [x] Soft   [] Tender  [] Viceromegaly   Extremities:  [x] No Edema   [] Edema  Face:   [x] Cleft palate    Cranial Nerves Examination:    CN II: [x] Pupils are reactive to light [] Pupils are non reactive to light  CN III, IV, VI:[x] No eye deviation  [x] No diplopia or ptosis   CN V: [x] Facial Sensation is intact  [] Facial Sensation is not intact   CN IIIV:  [x] Hearing is normal to rubbing fingers   CN IX, X:  [x] Normal gag reflex and phonation   CN XI: [x] Shoulder shrug and neck rotation is normal  CNXII: [x] Tongue is midline no deviation or atrophy       For further PE refer to ED note      MENTAL STATUS EXAM:       Cognition:      [x] Alert  [x] Awake  [x] Oriented  [x] Person  [x] Place [x] Time      [] drowsy  [] tired  [] lethargic  [] distractable     Attention/Concentration:   [x] Attentive  [] Distracted        Memory Recent and Remote: [] Intact   [] Impaired [] Partially Impaired     Language: [] Able to recognize and name objects          [] Unable to recognize and name Objects    Fund of Knowledge:  [] Poor [x]  Fair  [] Good    Speech: [] Normal  [] Soft  [x] Slow  [] Fast [] Pressured            [] Loud [x] Dysarthria  [] Incoherent          Appearance: [] Well Groomed  [x] Casual Dressed  [] Unkept  [x] Disheveled          [] Normal weight  [] Thin  [x] Overweight  [] Obese           Attitude: [] Positive  [] Hostile  [] Demanding  [] Guarded  [] Defensive [x] Cooperative  []  Uncooperative      Behavior:  [x] Normal Gait  [] Abnormal Gait [] Walks with Assistance  [] [address]     [] Walks with Adriana Lala  [] In Hospital Bed  [] Sitting in Chair    Muscle-Skeletal:  [x] Normal Muscle Tone [] Muscle Atrophy            [] Abnormal Muscle Movement     Eye Contact:  [x] Good eye contact  [] Intermittent Eye Contact  [] Poor Eye Contact    [] Excessive Eye Contact   [] Intrusive Eye Contact    Mood: [x] Depressed  [x] Anxious  [] Irritated  [] Euthymic   [] Angry [] Restless                   [] Apathetic    Affect:  [x] Congruent  [] Incongruent  [] Labile  [] Constricted  [x] Flat  [x] Bizarre                     [] Heightened    [] Exaggerated      Thought Process and Association:  [] Logical [x] Illogical       [] Linear and Goal Directed  [] Tangential  [x] Circumstantial     Thought Content:  [] Denies [x] Endorses [x] Suicidal [] Homicidal  [] Delusional      [] Paranoid  [] Somatic  [] Grandiose    Perception: []  None  [x] Auditory   [] Visual  [] tactile   [] olfactory  [] Illusions         Insight: [] Intact  [] Fair  [x] Limited    Judgement:  [] Intact  [] Fair  [x] Limited        ASSESSMENT    Patient Active Problem List   Diagnosis    Iritis of right eye    Severe major depression with psychotic features (HCC)    Bipolar 1 disorder, depressed, severe (HCC)     Recommendations and plan of treatment:  1- admit to inpatient unit  2- Unit [facility]   3- Medication Management  4- Group therapy and one on one. 5- Routine precautions      Signed:  Luis Daniel Porter  11/16/2018  4:03 PM      I saw and examined the patient and I agree with the above documentation.
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
## 2018-11-17 PROCEDURE — 6370000000 HC RX 637 (ALT 250 FOR IP): Performed by: NURSE PRACTITIONER

## 2018-11-17 PROCEDURE — 99231 SBSQ HOSP IP/OBS SF/LOW 25: CPT | Performed by: NURSE PRACTITIONER

## 2018-11-17 PROCEDURE — 1240000000 HC EMOTIONAL WELLNESS R&B

## 2018-11-17 RX ADMIN — CLONAZEPAM 0.5 MG: 0.5 TABLET ORAL at 12:33

## 2018-11-17 RX ADMIN — ARIPIPRAZOLE 30 MG: 15 TABLET ORAL at 08:52

## 2018-11-17 RX ADMIN — CLONAZEPAM 0.5 MG: 0.5 TABLET ORAL at 21:50

## 2018-11-17 RX ADMIN — DIVALPROEX SODIUM 250 MG: 250 TABLET, DELAYED RELEASE ORAL at 21:02

## 2018-11-17 RX ADMIN — MINERAL OIL AND WHITE PETROLATUM: 150; 830 OINTMENT OPHTHALMIC at 21:04

## 2018-11-17 RX ADMIN — ACYCLOVIR 800 MG: 800 TABLET ORAL at 11:29

## 2018-11-17 RX ADMIN — ACYCLOVIR 800 MG: 800 TABLET ORAL at 21:01

## 2018-11-17 RX ADMIN — ACYCLOVIR 800 MG: 800 TABLET ORAL at 14:38

## 2018-11-17 RX ADMIN — MIRTAZAPINE 30 MG: 15 TABLET, FILM COATED ORAL at 21:02

## 2018-11-17 RX ADMIN — HYDROXYZINE PAMOATE 50 MG: 50 CAPSULE ORAL at 16:18

## 2018-11-17 RX ADMIN — ACETAMINOPHEN 650 MG: 325 TABLET, FILM COATED ORAL at 17:48

## 2018-11-17 RX ADMIN — DIVALPROEX SODIUM 250 MG: 250 TABLET, DELAYED RELEASE ORAL at 08:52

## 2018-11-17 RX ADMIN — ACETAMINOPHEN 650 MG: 325 TABLET, FILM COATED ORAL at 12:33

## 2018-11-17 ASSESSMENT — PAIN SCALES - GENERAL
PAINLEVEL_OUTOF10: 7
PAINLEVEL_OUTOF10: 3

## 2018-11-17 NOTE — PLAN OF CARE
Problem: Depressive Behavior With or Without Suicide Precautions:  Goal: Able to verbalize acceptance of life and situations over which he or she has no control  Able to verbalize acceptance of life and situations over which he or she has no control   Outcome: Ongoing    Goal: Able to verbalize and/or display a decrease in depressive symptoms  Able to verbalize and/or display a decrease in depressive symptoms   Outcome: Ongoing      Comments: Pt. Denies SI, HI, and hallucinations this shift. Pt. Denies physical complaints currently.

## 2018-11-17 NOTE — PROGRESS NOTES
Group Therapy Note    Date: 11/17/2018  Start Time: 10:00 am  End Time:  10:45 am  Number of Participants: 14    Type of Group: Psychoeducation & Community Meeting    [address]  Module Name:  Effects of Nutrition and Exercise of Wellness Recovery  Session Number:  N/A    Patient's Goal:  Patient will have improved understanding of daily choices impact wellness recovery. Notes:  Positively participated in discussion. Identified daily goal as \" Feel less anxious\". Status After Intervention:  Improved    Participation Level:  Active Listener and Interactive    Participation Quality: Appropriate, Attentive and Sharing      Speech:  normal      Thought Process/Content: Linear      Affective Functioning: Flat      Mood: anxious      Level of consciousness:  Alert and Attentive      Response to Learning: Progressing to goal      Endings: None Reported    Modes of Intervention: Education, Support, Socialization and Exploration      Discipline Responsible: Psychoeducational Specialist      Signature:  Sheree Doe, [address]

## 2018-11-18 PROCEDURE — 6370000000 HC RX 637 (ALT 250 FOR IP): Performed by: NURSE PRACTITIONER

## 2018-11-18 PROCEDURE — 99231 SBSQ HOSP IP/OBS SF/LOW 25: CPT | Performed by: NURSE PRACTITIONER

## 2018-11-18 PROCEDURE — 1240000000 HC EMOTIONAL WELLNESS R&B

## 2018-11-18 RX ADMIN — FLUTICASONE PROPIONATE 1 SPRAY: 50 SPRAY, METERED NASAL at 08:54

## 2018-11-18 RX ADMIN — DIVALPROEX SODIUM 250 MG: 250 TABLET, DELAYED RELEASE ORAL at 21:30

## 2018-11-18 RX ADMIN — ACYCLOVIR 800 MG: 800 TABLET ORAL at 14:34

## 2018-11-18 RX ADMIN — TRAZODONE HYDROCHLORIDE 50 MG: 50 TABLET ORAL at 21:30

## 2018-11-18 RX ADMIN — ACYCLOVIR 800 MG: 800 TABLET ORAL at 07:09

## 2018-11-18 RX ADMIN — DIVALPROEX SODIUM 250 MG: 250 TABLET, DELAYED RELEASE ORAL at 08:54

## 2018-11-18 RX ADMIN — ALUMINUM HYDROXIDE, MAGNESIUM HYDROXIDE, AND SIMETHICONE 30 ML: 200; 200; 20 SUSPENSION ORAL at 15:57

## 2018-11-18 RX ADMIN — HYDROXYZINE PAMOATE 50 MG: 50 CAPSULE ORAL at 21:30

## 2018-11-18 RX ADMIN — ARIPIPRAZOLE 30 MG: 15 TABLET ORAL at 08:54

## 2018-11-18 RX ADMIN — CLONAZEPAM 0.5 MG: 0.5 TABLET ORAL at 09:35

## 2018-11-18 RX ADMIN — HYDROXYZINE PAMOATE 50 MG: 50 CAPSULE ORAL at 10:47

## 2018-11-18 RX ADMIN — ACYCLOVIR 800 MG: 800 TABLET ORAL at 10:34

## 2018-11-18 RX ADMIN — ACYCLOVIR 800 MG: 800 TABLET ORAL at 21:30

## 2018-11-18 RX ADMIN — MIRTAZAPINE 30 MG: 15 TABLET, FILM COATED ORAL at 21:30

## 2018-11-18 ASSESSMENT — PAIN SCALES - GENERAL: PAINLEVEL_OUTOF10: 0

## 2018-11-18 NOTE — PLAN OF CARE
ambulatory, cooperative    Seizure disorder ([facility] 2010    none recent    Tourettes syndrome        Risk:  Fall RiskTotal: 57  Jake Scale Jake Scale Score: 22  BVC Total: 0  Change in scores No. Changes to plan of Care  No    Status EXAM:   Status and Exam  Normal: Yes  Facial Expression: Brightened, Worried  Affect: Congruent  Level of Consciousness: Alert  Mood:Normal: No  Mood: Anxious  Motor Activity:Normal: Yes  Motor Activity: Increased  Interview Behavior: Cooperative  Preception: [place] to Person, [place] to Time, [place] to Place, [place] to Situation  Attention:Normal: No  Attention: Distractible  Thought Processes: Circumstantial  Thought Content:Normal: Yes  Hallucinations: None  Delusions: No  Memory:Normal: No  Memory: Poor Recent  Insight and Judgment: No  Insight and Judgment: Poor Judgment  Present Suicidal Ideation: No  Present Homicidal Ideation: No    Daily Assessment Last Entry:   Daily Sleep (WDL): Within Defined Limits         Patient Currently in Pain: Denies  Daily Nutrition (WDL): Within Defined Limits    Patient Monitoring:  Frequency of Checks: 4 times per hour, close    Psychiatric Symptoms:   Depression Symptoms  Depression Symptoms: Impaired concentration  Anxiety Symptoms  Anxiety Symptoms: Generalized  Joanne Symptoms  Joanne Symptoms: No problems reported or observed. Psychosis Symptoms  Delusion Type: No problems reported or observed. Suicide Risk CSSR-S:  1) Within the past month, have you wished you were dead or wished you could go to sleep and not wake up? : YES  2) Within the past month, have you actually had any thoughts of killing yourself? : YES  3) Within the past month, have you been thinking about how you might kill yourself? : YES  5) Within the past month, have you started to work out or worked out the details of how to kill yourself?  Do you intend to carry out this plan? : YES  6)  Have you ever done anything, started to do anything, or prepared to do anything to end your life?: YES  Change in Result no Change in Plan of care No      EDUCATION:   Learner Progress Toward Treatment Goals: Reviewed results and recommendations of this team, Reviewed group plan and strategies, Reviewed signs, symptoms and risk of self harm and violent behavior and Reviewed goals and plan of care    Method: Individual    Outcome: Verbalized understanding and Demonstrated Understanding    PATIENT GOALS: \"Decrease my anxiety\"    PLAN/TREATMENT RECOMMENDATIONS UPDATE: Offer and encourage groups and provide emotional support.     GOALS UPDATE:   Time frame for Short-Term Goals: one week      Junie Meadows RN

## 2018-11-19 PROCEDURE — 99232 SBSQ HOSP IP/OBS MODERATE 35: CPT | Performed by: NURSE PRACTITIONER

## 2018-11-19 PROCEDURE — 1240000000 HC EMOTIONAL WELLNESS R&B

## 2018-11-19 PROCEDURE — 6370000000 HC RX 637 (ALT 250 FOR IP): Performed by: NURSE PRACTITIONER

## 2018-11-19 PROCEDURE — 6370000000 HC RX 637 (ALT 250 FOR IP): Performed by: PSYCHIATRY & NEUROLOGY

## 2018-11-19 RX ORDER — DIVALPROEX SODIUM 250 MG/1
250 TABLET, DELAYED RELEASE ORAL 3 TIMES DAILY
Status: DISCONTINUED | OUTPATIENT
Start: 2018-11-19 | End: 2018-11-20 | Stop reason: HOSPADM

## 2018-11-19 RX ADMIN — DIVALPROEX SODIUM 250 MG: 250 TABLET, DELAYED RELEASE ORAL at 20:18

## 2018-11-19 RX ADMIN — HYDROXYZINE PAMOATE 50 MG: 50 CAPSULE ORAL at 20:18

## 2018-11-19 RX ADMIN — ACYCLOVIR 800 MG: 800 TABLET ORAL at 20:00

## 2018-11-19 RX ADMIN — CLONAZEPAM 0.5 MG: 0.5 TABLET ORAL at 10:24

## 2018-11-19 RX ADMIN — TRAZODONE HYDROCHLORIDE 50 MG: 50 TABLET ORAL at 20:18

## 2018-11-19 RX ADMIN — DIVALPROEX SODIUM 250 MG: 250 TABLET, DELAYED RELEASE ORAL at 14:46

## 2018-11-19 RX ADMIN — ACYCLOVIR 800 MG: 800 TABLET ORAL at 14:46

## 2018-11-19 RX ADMIN — MIRTAZAPINE 30 MG: 15 TABLET, FILM COATED ORAL at 20:18

## 2018-11-19 RX ADMIN — ACETAMINOPHEN 650 MG: 325 TABLET, FILM COATED ORAL at 20:18

## 2018-11-19 RX ADMIN — ACYCLOVIR 800 MG: 800 TABLET ORAL at 10:24

## 2018-11-19 ASSESSMENT — PAIN DESCRIPTION - LOCATION: LOCATION: ARM

## 2018-11-19 ASSESSMENT — PAIN SCALES - GENERAL
PAINLEVEL_OUTOF10: 9
PAINLEVEL_OUTOF10: 5

## 2018-11-19 ASSESSMENT — PAIN DESCRIPTION - DESCRIPTORS: DESCRIPTORS: ACHING

## 2018-11-19 ASSESSMENT — PAIN DESCRIPTION - ORIENTATION: ORIENTATION: UPPER;LEFT

## 2018-11-19 ASSESSMENT — PAIN DESCRIPTION - PAIN TYPE: TYPE: ACUTE PAIN

## 2018-11-19 NOTE — PROGRESS NOTES
Group Therapy Note    Date: 11/19/2018  Start Time: 350  End Time:  430  Number of Participants: 10    Type of Group: Recreational    Wellness Binder Information  Module Name: popsicle stick trivia/ floor flow rules etc   Session Number: na    Patient's Goal: patient will be able to id flow of floor, rules and expectations. patient will engage in trivia, and participate appropriately. Notes:  Engaged in group, asking questions and willing to participate in trivia. Status After Intervention:  Improved    Participation Level:  Active Listener and Interactive    Participation Quality: Appropriate, Attentive and Sharing      Speech:  normal      Thought Process/Content: Logical      Affective Functioning: Congruent      Mood: euthymic      Level of consciousness:  Alert, Oriented x4 and Attentive      Response to Learning: Able to verbalize/acknowledge new learning, Able to retain information and Progressing to goal      Endings: None Reported    Modes of Intervention: Education, Support, Socialization, Exploration and Activity      Discipline Responsible: Psychoeducational Specialist      Signature:  Sarah Sparrow

## 2018-11-20 VITALS
SYSTOLIC BLOOD PRESSURE: 144 MMHG | TEMPERATURE: 97.4 F | OXYGEN SATURATION: 96 % | RESPIRATION RATE: 18 BRPM | DIASTOLIC BLOOD PRESSURE: 77 MMHG | HEART RATE: 70 BPM | WEIGHT: 260 LBS | HEIGHT: 69 IN | BODY MASS INDEX: 38.51 KG/M2

## 2018-11-20 PROCEDURE — 99238 HOSP IP/OBS DSCHRG MGMT 30/<: CPT | Performed by: NURSE PRACTITIONER

## 2018-11-20 PROCEDURE — 6370000000 HC RX 637 (ALT 250 FOR IP): Performed by: NURSE PRACTITIONER

## 2018-11-20 PROCEDURE — 6370000000 HC RX 637 (ALT 250 FOR IP): Performed by: PSYCHIATRY & NEUROLOGY

## 2018-11-20 RX ORDER — DIVALPROEX SODIUM 250 MG/1
250 TABLET, DELAYED RELEASE ORAL 3 TIMES DAILY
Qty: 90 TABLET | Refills: 0 | Status: ON HOLD | OUTPATIENT
Start: 2018-11-20 | End: 2019-01-28 | Stop reason: HOSPADM

## 2018-11-20 RX ADMIN — CLONAZEPAM 0.5 MG: 0.5 TABLET ORAL at 10:45

## 2018-11-20 RX ADMIN — DIVALPROEX SODIUM 250 MG: 250 TABLET, DELAYED RELEASE ORAL at 10:46

## 2018-11-20 RX ADMIN — ACYCLOVIR 800 MG: 800 TABLET ORAL at 10:45

## 2018-11-20 RX ADMIN — ACYCLOVIR 800 MG: 800 TABLET ORAL at 00:36

## 2018-11-20 RX ADMIN — ACYCLOVIR 800 MG: 800 TABLET ORAL at 07:01

## 2018-11-20 ASSESSMENT — PAIN SCALES - GENERAL: PAINLEVEL_OUTOF10: 0

## 2018-11-20 NOTE — CARE COORDINATION
DEVAN spoke with pt's RN regarding discharge. DEVAN placed call to Lino Hi with [address] regarding pt's case and informed him that pt would be ready at 1:30pm. He was in agreement.

## 2018-11-20 NOTE — PROGRESS NOTES
Group Therapy Note    Date: 11/20/2018  Start Time: 10:00  End Time:  10:40  Number of Participants:8  Type of Group: Psychoeducation    Wellness Binder Information  Module Name:  Dimensions of Wellness  Session Number:  n/a    Patient's Goal:  Patient will identify dimensions of wellness and how to utilize them in recovery. Notes:  Patient was pleasant and interactive during group identifying dimensions of wellness what is going well. Patient gave support and feedback to others. Status After Intervention:  Improved    Participation Level:  Active Listener and Interactive    Participation Quality: Appropriate, Attentive and Sharing      Speech:  normal      Thought Process/Content: Logical      Affective Functioning: Congruent      Mood: euthymic      Level of consciousness:  Alert, Oriented x4 and Attentive      Response to Learning: Able to verbalize current knowledge/experience, Able to verbalize/acknowledge new learning, Able to retain information, Capable of insight, Able to change behavior and Progressing to goal      Endings: None Reported    Modes of Intervention: Education, Support, Socialization, Exploration, Clarifying and Problem-solving      Discipline Responsible: Psychoeducational Specialist      Signature:  [employer]

## 2018-11-20 NOTE — PROGRESS NOTES
Pt discharged with followings belongings:   Dentures: None  Vision - Corrective Lenses: Glasses  Hearing Aid: None  Jewelry: Watch (black watch)  Body Piercings Removed: N/A  Clothing: Footwear, Pants, Shirt, Socks, Undergarments (Comment)  Were All Patient Medications Collected?: Not Applicable  Other Valuables: Other (Comment)   Valuables sent home with on discharge. Patient left department with Departure Mode: Other (Comment) (with group home staff) via Mobility at Departure: Ambulatory, discharged to Discharged to: Group Home. Patient education on aftercare instructions: yes  Patient verbalize understanding of AVS:  yes. Given suicide prevention handout . Discharged in stable condition.   Status EXAM upon discharge:  Status and Exam  Normal: Yes  Facial Expression: Brightened  Affect: Appropriate, Congruent  Level of Consciousness: Alert  Mood:Normal: Yes  Mood: Anxious  Motor Activity:Normal: Yes  Motor Activity: Increased  Interview Behavior: Cooperative  Preception: [place] to Person, [place] to Time, [place] to Place, [place] to Situation  Attention:Normal: No  Attention: Distractible  Thought Processes: Circumstantial  Thought Content:Normal: Yes  Hallucinations: None  Delusions: No  Memory:Normal: No  Memory: Poor Recent  Insight and Judgment: No  Insight and Judgment: Poor Insight  Present Suicidal Ideation: No  Present Homicidal Ideation: No    Juan Manuel Robledo RN

## 2019-01-15 ENCOUNTER — APPOINTMENT (OUTPATIENT)
Dept: CT IMAGING | Age: 24
End: 2019-01-15
Payer: COMMERCIAL

## 2019-01-15 ENCOUNTER — HOSPITAL ENCOUNTER (OUTPATIENT)
Age: 24
Setting detail: OBSERVATION
Discharge: HOME | End: 2019-01-17
Attending: EMERGENCY MEDICINE | Admitting: INTERNAL MEDICINE
Payer: COMMERCIAL

## 2019-01-15 DIAGNOSIS — R29.810 FACIAL DROOP: Primary | ICD-10-CM

## 2019-01-15 DIAGNOSIS — G51.0 BELL'S PALSY: ICD-10-CM

## 2019-01-15 LAB
ALBUMIN SERPL-MCNC: 4.4 G/DL (ref 3.5–5.2)
ALP BLD-CCNC: 85 U/L (ref 40–129)
ALT SERPL-CCNC: 17 U/L (ref 0–40)
ANION GAP SERPL CALCULATED.3IONS-SCNC: 12 MMOL/L (ref 7–16)
AST SERPL-CCNC: 27 U/L (ref 0–39)
BASOPHILS ABSOLUTE: 0.01 E9/L (ref 0–0.2)
BASOPHILS RELATIVE PERCENT: 0.2 % (ref 0–2)
BILIRUB SERPL-MCNC: 0.3 MG/DL (ref 0–1.2)
BILIRUBIN URINE: NEGATIVE
BLOOD, URINE: NEGATIVE
BUN BLDV-MCNC: 10 MG/DL (ref 6–20)
CALCIUM SERPL-MCNC: 9.3 MG/DL (ref 8.6–10.2)
CHLORIDE BLD-SCNC: 102 MMOL/L (ref 98–107)
CLARITY: CLEAR
CO2: 26 MMOL/L (ref 22–29)
COLOR: YELLOW
CREAT SERPL-MCNC: 0.8 MG/DL (ref 0.7–1.2)
EOSINOPHILS ABSOLUTE: 0.2 E9/L (ref 0.05–0.5)
EOSINOPHILS RELATIVE PERCENT: 3.4 % (ref 0–6)
GFR AFRICAN AMERICAN: >60
GFR NON-AFRICAN AMERICAN: >60 ML/MIN/1.73
GLUCOSE BLD-MCNC: 71 MG/DL (ref 74–99)
GLUCOSE URINE: NEGATIVE MG/DL
HCT VFR BLD CALC: 45.2 % (ref 37–54)
HEMOGLOBIN: 15.3 G/DL (ref 12.5–16.5)
IMMATURE GRANULOCYTES #: 0.01 E9/L
IMMATURE GRANULOCYTES %: 0.2 % (ref 0–5)
KETONES, URINE: NEGATIVE MG/DL
LACTIC ACID: 1.6 MMOL/L (ref 0.5–2.2)
LEUKOCYTE ESTERASE, URINE: NEGATIVE
LYMPHOCYTES ABSOLUTE: 1.99 E9/L (ref 1.5–4)
LYMPHOCYTES RELATIVE PERCENT: 33.9 % (ref 20–42)
MAGNESIUM: 2 MG/DL (ref 1.6–2.6)
MCH RBC QN AUTO: 32.2 PG (ref 26–35)
MCHC RBC AUTO-ENTMCNC: 33.8 % (ref 32–34.5)
MCV RBC AUTO: 95.2 FL (ref 80–99.9)
MONOCYTES ABSOLUTE: 0.44 E9/L (ref 0.1–0.95)
MONOCYTES RELATIVE PERCENT: 7.5 % (ref 2–12)
NEUTROPHILS ABSOLUTE: 3.22 E9/L (ref 1.8–7.3)
NEUTROPHILS RELATIVE PERCENT: 54.8 % (ref 43–80)
NITRITE, URINE: NEGATIVE
PDW BLD-RTO: 12.6 FL (ref 11.5–15)
PH UA: 6.5 (ref 5–9)
PLATELET # BLD: 265 E9/L (ref 130–450)
PMV BLD AUTO: 9.5 FL (ref 7–12)
POTASSIUM SERPL-SCNC: 4.3 MMOL/L (ref 3.5–5)
PROTEIN UA: NEGATIVE MG/DL
RBC # BLD: 4.75 E12/L (ref 3.8–5.8)
SODIUM BLD-SCNC: 140 MMOL/L (ref 132–146)
SPECIFIC GRAVITY UA: <=1.005 (ref 1–1.03)
TOTAL PROTEIN: 7.5 G/DL (ref 6.4–8.3)
UROBILINOGEN, URINE: 0.2 E.U./DL
WBC # BLD: 5.9 E9/L (ref 4.5–11.5)

## 2019-01-15 PROCEDURE — 70450 CT HEAD/BRAIN W/O DYE: CPT

## 2019-01-15 PROCEDURE — 81003 URINALYSIS AUTO W/O SCOPE: CPT

## 2019-01-15 PROCEDURE — 6360000004 HC RX CONTRAST MEDICATION: Performed by: RADIOLOGY

## 2019-01-15 PROCEDURE — 70486 CT MAXILLOFACIAL W/O DYE: CPT

## 2019-01-15 PROCEDURE — 83605 ASSAY OF LACTIC ACID: CPT

## 2019-01-15 PROCEDURE — 85025 COMPLETE CBC W/AUTO DIFF WBC: CPT

## 2019-01-15 PROCEDURE — 99284 EMERGENCY DEPT VISIT MOD MDM: CPT

## 2019-01-15 PROCEDURE — 80053 COMPREHEN METABOLIC PANEL: CPT

## 2019-01-15 PROCEDURE — 83735 ASSAY OF MAGNESIUM: CPT

## 2019-01-15 PROCEDURE — 70470 CT HEAD/BRAIN W/O & W/DYE: CPT

## 2019-01-15 PROCEDURE — 70460 CT HEAD/BRAIN W/DYE: CPT

## 2019-01-15 RX ADMIN — IOPAMIDOL 90 ML: 755 INJECTION, SOLUTION INTRAVENOUS at 22:56

## 2019-01-15 ASSESSMENT — ENCOUNTER SYMPTOMS
FACIAL SWELLING: 1
SHORTNESS OF BREATH: 0
DIARRHEA: 0
NAUSEA: 0
EYE PAIN: 0
WHEEZING: 0
SORE THROAT: 0
BACK PAIN: 0
EYE REDNESS: 0
VOMITING: 0
RHINORRHEA: 0
COUGH: 0
ABDOMINAL PAIN: 0
EYE DISCHARGE: 0

## 2019-01-16 ENCOUNTER — APPOINTMENT (OUTPATIENT)
Dept: MRI IMAGING | Age: 24
End: 2019-01-16
Payer: COMMERCIAL

## 2019-01-16 LAB
GLUCOSE BLD-MCNC: 97 MG/DL
HIV-1 AND HIV-2 ANTIBODIES: NORMAL
METER GLUCOSE: 97 MG/DL (ref 74–99)
SEDIMENTATION RATE, ERYTHROCYTE: 8 MM/HR (ref 0–15)
VALPROIC ACID LEVEL: 59 MCG/ML (ref 50–100)

## 2019-01-16 PROCEDURE — 36415 COLL VENOUS BLD VENIPUNCTURE: CPT

## 2019-01-16 PROCEDURE — 93005 ELECTROCARDIOGRAM TRACING: CPT | Performed by: INTERNAL MEDICINE

## 2019-01-16 PROCEDURE — G0378 HOSPITAL OBSERVATION PER HR: HCPCS

## 2019-01-16 PROCEDURE — 85651 RBC SED RATE NONAUTOMATED: CPT

## 2019-01-16 PROCEDURE — A9579 GAD-BASE MR CONTRAST NOS,1ML: HCPCS | Performed by: RADIOLOGY

## 2019-01-16 PROCEDURE — 70553 MRI BRAIN STEM W/O & W/DYE: CPT

## 2019-01-16 PROCEDURE — 6360000004 HC RX CONTRAST MEDICATION: Performed by: RADIOLOGY

## 2019-01-16 PROCEDURE — 2580000003 HC RX 258: Performed by: INTERNAL MEDICINE

## 2019-01-16 PROCEDURE — 80164 ASSAY DIPROPYLACETIC ACD TOT: CPT

## 2019-01-16 PROCEDURE — 6370000000 HC RX 637 (ALT 250 FOR IP): Performed by: INTERNAL MEDICINE

## 2019-01-16 PROCEDURE — 82962 GLUCOSE BLOOD TEST: CPT

## 2019-01-16 PROCEDURE — 99218 PR INITIAL OBSERVATION CARE/DAY 30 MINUTES: CPT | Performed by: PSYCHIATRY & NEUROLOGY

## 2019-01-16 PROCEDURE — 99234 HOSP IP/OBS SM DT SF/LOW 45: CPT | Performed by: INTERNAL MEDICINE

## 2019-01-16 PROCEDURE — 86703 HIV-1/HIV-2 1 RESULT ANTBDY: CPT

## 2019-01-16 RX ORDER — ONDANSETRON 2 MG/ML
4 INJECTION INTRAMUSCULAR; INTRAVENOUS EVERY 6 HOURS PRN
Status: DISCONTINUED | OUTPATIENT
Start: 2019-01-16 | End: 2019-01-17 | Stop reason: HOSPADM

## 2019-01-16 RX ORDER — DIVALPROEX SODIUM 250 MG/1
250 TABLET, DELAYED RELEASE ORAL 3 TIMES DAILY
Status: DISCONTINUED | OUTPATIENT
Start: 2019-01-16 | End: 2019-01-17 | Stop reason: HOSPADM

## 2019-01-16 RX ORDER — CLONAZEPAM 0.5 MG/1
0.5 TABLET ORAL 2 TIMES DAILY PRN
Status: DISCONTINUED | OUTPATIENT
Start: 2019-01-16 | End: 2019-01-17 | Stop reason: HOSPADM

## 2019-01-16 RX ORDER — SODIUM CHLORIDE 0.9 % (FLUSH) 0.9 %
10 SYRINGE (ML) INJECTION PRN
Status: DISCONTINUED | OUTPATIENT
Start: 2019-01-16 | End: 2019-01-17 | Stop reason: HOSPADM

## 2019-01-16 RX ORDER — POLYVINYL ALCOHOL 14 MG/ML
SOLUTION/ DROPS OPHTHALMIC PRN
Status: DISCONTINUED | OUTPATIENT
Start: 2019-01-16 | End: 2019-01-17 | Stop reason: HOSPADM

## 2019-01-16 RX ORDER — MIRTAZAPINE 15 MG/1
30 TABLET, FILM COATED ORAL NIGHTLY
Status: DISCONTINUED | OUTPATIENT
Start: 2019-01-16 | End: 2019-01-17 | Stop reason: HOSPADM

## 2019-01-16 RX ORDER — SODIUM CHLORIDE 0.9 % (FLUSH) 0.9 %
10 SYRINGE (ML) INJECTION EVERY 12 HOURS SCHEDULED
Status: DISCONTINUED | OUTPATIENT
Start: 2019-01-16 | End: 2019-01-17 | Stop reason: HOSPADM

## 2019-01-16 RX ORDER — FLUTICASONE PROPIONATE 50 MCG
1 SPRAY, SUSPENSION (ML) NASAL DAILY
Status: DISCONTINUED | OUTPATIENT
Start: 2019-01-16 | End: 2019-01-17 | Stop reason: HOSPADM

## 2019-01-16 RX ADMIN — Medication 10 ML: at 08:37

## 2019-01-16 RX ADMIN — MIRTAZAPINE 30 MG: 15 TABLET, FILM COATED ORAL at 20:53

## 2019-01-16 RX ADMIN — Medication 10 ML: at 20:50

## 2019-01-16 RX ADMIN — DIVALPROEX SODIUM 250 MG: 250 TABLET, DELAYED RELEASE ORAL at 20:53

## 2019-01-16 RX ADMIN — GADOTERIDOL 20 ML: 279.3 INJECTION, SOLUTION INTRAVENOUS at 20:22

## 2019-01-16 RX ADMIN — DIVALPROEX SODIUM 250 MG: 250 TABLET, DELAYED RELEASE ORAL at 08:37

## 2019-01-16 RX ADMIN — CLONAZEPAM 0.5 MG: 0.5 TABLET ORAL at 19:27

## 2019-01-16 RX ADMIN — DIVALPROEX SODIUM 250 MG: 250 TABLET, DELAYED RELEASE ORAL at 14:02

## 2019-01-16 ASSESSMENT — ENCOUNTER SYMPTOMS
CHEST TIGHTNESS: 0
SHORTNESS OF BREATH: 1
BLOOD IN STOOL: 1
ABDOMINAL PAIN: 0
TROUBLE SWALLOWING: 0
EYE PAIN: 1

## 2019-01-16 ASSESSMENT — PAIN SCALES - GENERAL
PAINLEVEL_OUTOF10: 0

## 2019-01-16 ASSESSMENT — VISUAL ACUITY: METHOD_CF: 1

## 2019-01-17 VITALS
SYSTOLIC BLOOD PRESSURE: 129 MMHG | HEART RATE: 80 BPM | OXYGEN SATURATION: 98 % | RESPIRATION RATE: 14 BRPM | DIASTOLIC BLOOD PRESSURE: 68 MMHG | BODY MASS INDEX: 32.1 KG/M2 | TEMPERATURE: 97.4 F | HEIGHT: 71 IN | WEIGHT: 229.3 LBS

## 2019-01-17 PROCEDURE — 99217 PR OBSERVATION CARE DISCHARGE MANAGEMENT: CPT | Performed by: INTERNAL MEDICINE

## 2019-01-17 PROCEDURE — 2580000003 HC RX 258: Performed by: INTERNAL MEDICINE

## 2019-01-17 PROCEDURE — 6360000002 HC RX W HCPCS: Performed by: INTERNAL MEDICINE

## 2019-01-17 PROCEDURE — 96372 THER/PROPH/DIAG INJ SC/IM: CPT

## 2019-01-17 PROCEDURE — G0378 HOSPITAL OBSERVATION PER HR: HCPCS

## 2019-01-17 PROCEDURE — 6370000000 HC RX 637 (ALT 250 FOR IP): Performed by: INTERNAL MEDICINE

## 2019-01-17 PROCEDURE — 99225 PR SBSQ OBSERVATION CARE/DAY 25 MINUTES: CPT | Performed by: PHYSICIAN ASSISTANT

## 2019-01-17 RX ADMIN — Medication 10 ML: at 09:16

## 2019-01-17 RX ADMIN — DIVALPROEX SODIUM 250 MG: 250 TABLET, DELAYED RELEASE ORAL at 13:44

## 2019-01-17 RX ADMIN — ENOXAPARIN SODIUM 40 MG: 40 INJECTION SUBCUTANEOUS at 09:16

## 2019-01-17 RX ADMIN — FLUTICASONE PROPIONATE 1 SPRAY: 50 SPRAY, METERED NASAL at 09:16

## 2019-01-17 RX ADMIN — DIVALPROEX SODIUM 250 MG: 250 TABLET, DELAYED RELEASE ORAL at 09:16

## 2019-01-17 ASSESSMENT — PAIN SCALES - GENERAL
PAINLEVEL_OUTOF10: 0
PAINLEVEL_OUTOF10: 0

## 2019-01-18 LAB
EKG ATRIAL RATE: 52 BPM
EKG P-R INTERVAL: 160 MS
EKG Q-T INTERVAL: 436 MS
EKG QRS DURATION: 90 MS
EKG QTC CALCULATION (BAZETT): 405 MS
EKG R AXIS: 141 DEGREES
EKG T AXIS: 129 DEGREES
EKG VENTRICULAR RATE: 52 BPM

## 2019-01-18 PROCEDURE — 93010 ELECTROCARDIOGRAM REPORT: CPT | Performed by: INTERNAL MEDICINE

## 2019-01-21 ENCOUNTER — TELEPHONE (OUTPATIENT)
Dept: FAMILY MEDICINE CLINIC | Age: 24
End: 2019-01-21

## 2019-01-25 ENCOUNTER — APPOINTMENT (OUTPATIENT)
Dept: GENERAL RADIOLOGY | Age: 24
DRG: 751 | End: 2019-01-25
Payer: COMMERCIAL

## 2019-01-25 ENCOUNTER — HOSPITAL ENCOUNTER (INPATIENT)
Age: 24
LOS: 3 days | Discharge: HOME | DRG: 751 | End: 2019-01-28
Attending: EMERGENCY MEDICINE | Admitting: PSYCHIATRY & NEUROLOGY
Payer: COMMERCIAL

## 2019-01-25 DIAGNOSIS — R45.851 SUICIDAL IDEATION: Primary | ICD-10-CM

## 2019-01-25 PROBLEM — F31.9 BIPOLAR 1 DISORDER (HCC): Status: ACTIVE | Noted: 2019-01-25

## 2019-01-25 LAB
ACETAMINOPHEN LEVEL: <5 MCG/ML (ref 10–30)
ALBUMIN SERPL-MCNC: 4.6 G/DL (ref 3.5–5.2)
ALP BLD-CCNC: 87 U/L (ref 40–129)
ALT SERPL-CCNC: <5 U/L (ref 0–40)
AMPHETAMINE SCREEN, URINE: NOT DETECTED
ANION GAP SERPL CALCULATED.3IONS-SCNC: 15 MMOL/L (ref 7–16)
AST SERPL-CCNC: 18 U/L (ref 0–39)
BARBITURATE SCREEN URINE: NOT DETECTED
BASOPHILS ABSOLUTE: 0.01 E9/L (ref 0–0.2)
BASOPHILS RELATIVE PERCENT: 0.1 % (ref 0–2)
BENZODIAZEPINE SCREEN, URINE: NOT DETECTED
BILIRUB SERPL-MCNC: 0.3 MG/DL (ref 0–1.2)
BILIRUBIN URINE: NEGATIVE
BLOOD, URINE: NEGATIVE
BUN BLDV-MCNC: 21 MG/DL (ref 6–20)
CALCIUM SERPL-MCNC: 9.7 MG/DL (ref 8.6–10.2)
CANNABINOID SCREEN URINE: NOT DETECTED
CHLORIDE BLD-SCNC: 104 MMOL/L (ref 98–107)
CLARITY: CLEAR
CO2: 23 MMOL/L (ref 22–29)
COCAINE METABOLITE SCREEN URINE: NOT DETECTED
COLOR: YELLOW
CREAT SERPL-MCNC: 0.8 MG/DL (ref 0.7–1.2)
EKG ATRIAL RATE: 68 BPM
EKG P AXIS: 31 DEGREES
EKG P-R INTERVAL: 118 MS
EKG Q-T INTERVAL: 364 MS
EKG QRS DURATION: 100 MS
EKG QTC CALCULATION (BAZETT): 387 MS
EKG R AXIS: 57 DEGREES
EKG T AXIS: -12 DEGREES
EKG VENTRICULAR RATE: 68 BPM
EOSINOPHILS ABSOLUTE: 0.1 E9/L (ref 0.05–0.5)
EOSINOPHILS RELATIVE PERCENT: 1.2 % (ref 0–6)
ETHANOL: <10 MG/DL (ref 0–0.08)
GFR AFRICAN AMERICAN: >60
GFR NON-AFRICAN AMERICAN: >60 ML/MIN/1.73
GLUCOSE BLD-MCNC: 88 MG/DL (ref 74–99)
GLUCOSE URINE: NEGATIVE MG/DL
HCT VFR BLD CALC: 46.8 % (ref 37–54)
HEMOGLOBIN: 15.6 G/DL (ref 12.5–16.5)
IMMATURE GRANULOCYTES #: 0.02 E9/L
IMMATURE GRANULOCYTES %: 0.2 % (ref 0–5)
KETONES, URINE: 15 MG/DL
LEUKOCYTE ESTERASE, URINE: NEGATIVE
LYMPHOCYTES ABSOLUTE: 2.04 E9/L (ref 1.5–4)
LYMPHOCYTES RELATIVE PERCENT: 24.4 % (ref 20–42)
MCH RBC QN AUTO: 31.9 PG (ref 26–35)
MCHC RBC AUTO-ENTMCNC: 33.3 % (ref 32–34.5)
MCV RBC AUTO: 95.7 FL (ref 80–99.9)
METHADONE SCREEN, URINE: NOT DETECTED
MONOCYTES ABSOLUTE: 0.57 E9/L (ref 0.1–0.95)
MONOCYTES RELATIVE PERCENT: 6.8 % (ref 2–12)
NEUTROPHILS ABSOLUTE: 5.62 E9/L (ref 1.8–7.3)
NEUTROPHILS RELATIVE PERCENT: 67.3 % (ref 43–80)
NITRITE, URINE: NEGATIVE
OPIATE SCREEN URINE: NOT DETECTED
PDW BLD-RTO: 12.7 FL (ref 11.5–15)
PH UA: 6 (ref 5–9)
PHENCYCLIDINE SCREEN URINE: NOT DETECTED
PLATELET # BLD: 265 E9/L (ref 130–450)
PMV BLD AUTO: 9.6 FL (ref 7–12)
POTASSIUM SERPL-SCNC: 4.1 MMOL/L (ref 3.5–5)
PROPOXYPHENE SCREEN: NOT DETECTED
PROTEIN UA: NEGATIVE MG/DL
RBC # BLD: 4.89 E12/L (ref 3.8–5.8)
SALICYLATE, SERUM: <0.3 MG/DL (ref 0–30)
SODIUM BLD-SCNC: 142 MMOL/L (ref 132–146)
SPECIFIC GRAVITY UA: 1.02 (ref 1–1.03)
TOTAL PROTEIN: 7.8 G/DL (ref 6.4–8.3)
TRICYCLIC ANTIDEPRESSANTS SCREEN SERUM: NEGATIVE NG/ML
UROBILINOGEN, URINE: 1 E.U./DL
VALPROIC ACID LEVEL: 57 MCG/ML (ref 50–100)
WBC # BLD: 8.4 E9/L (ref 4.5–11.5)

## 2019-01-25 PROCEDURE — 81003 URINALYSIS AUTO W/O SCOPE: CPT

## 2019-01-25 PROCEDURE — 80164 ASSAY DIPROPYLACETIC ACD TOT: CPT

## 2019-01-25 PROCEDURE — G0480 DRUG TEST DEF 1-7 CLASSES: HCPCS

## 2019-01-25 PROCEDURE — 6370000000 HC RX 637 (ALT 250 FOR IP)

## 2019-01-25 PROCEDURE — 1240000000 HC EMOTIONAL WELLNESS R&B

## 2019-01-25 PROCEDURE — 36415 COLL VENOUS BLD VENIPUNCTURE: CPT

## 2019-01-25 PROCEDURE — 85025 COMPLETE CBC W/AUTO DIFF WBC: CPT

## 2019-01-25 PROCEDURE — 71045 X-RAY EXAM CHEST 1 VIEW: CPT

## 2019-01-25 PROCEDURE — 99285 EMERGENCY DEPT VISIT HI MDM: CPT

## 2019-01-25 PROCEDURE — 93005 ELECTROCARDIOGRAM TRACING: CPT | Performed by: PHYSICIAN ASSISTANT

## 2019-01-25 PROCEDURE — 80053 COMPREHEN METABOLIC PANEL: CPT

## 2019-01-25 PROCEDURE — 80307 DRUG TEST PRSMV CHEM ANLYZR: CPT

## 2019-01-25 RX ORDER — BENZTROPINE MESYLATE 1 MG/ML
2 INJECTION INTRAMUSCULAR; INTRAVENOUS 2 TIMES DAILY PRN
Status: DISCONTINUED | OUTPATIENT
Start: 2019-01-25 | End: 2019-01-28 | Stop reason: HOSPADM

## 2019-01-25 RX ORDER — OLANZAPINE 10 MG/1
10 TABLET ORAL
Status: ACTIVE | OUTPATIENT
Start: 2019-01-25 | End: 2019-01-25

## 2019-01-25 RX ORDER — MAGNESIUM HYDROXIDE/ALUMINUM HYDROXICE/SIMETHICONE 120; 1200; 1200 MG/30ML; MG/30ML; MG/30ML
30 SUSPENSION ORAL PRN
Status: DISCONTINUED | OUTPATIENT
Start: 2019-01-25 | End: 2019-01-28 | Stop reason: HOSPADM

## 2019-01-25 RX ORDER — TRAZODONE HYDROCHLORIDE 50 MG/1
50 TABLET ORAL NIGHTLY PRN
Status: DISCONTINUED | OUTPATIENT
Start: 2019-01-26 | End: 2019-01-28 | Stop reason: HOSPADM

## 2019-01-25 RX ORDER — HALOPERIDOL 5 MG/ML
10 INJECTION INTRAMUSCULAR EVERY 6 HOURS PRN
Status: DISCONTINUED | OUTPATIENT
Start: 2019-01-25 | End: 2019-01-28 | Stop reason: HOSPADM

## 2019-01-25 RX ORDER — OLANZAPINE 10 MG/1
TABLET ORAL
Status: DISCONTINUED
Start: 2019-01-25 | End: 2019-01-26

## 2019-01-25 RX ORDER — ACETAMINOPHEN 325 MG/1
650 TABLET ORAL EVERY 4 HOURS PRN
Status: DISCONTINUED | OUTPATIENT
Start: 2019-01-25 | End: 2019-01-28 | Stop reason: HOSPADM

## 2019-01-25 RX ADMIN — OLANZAPINE 10 MG: 5 TABLET, ORALLY DISINTEGRATING ORAL at 23:58

## 2019-01-25 ASSESSMENT — PAIN SCALES - WONG BAKER: WONGBAKER_NUMERICALRESPONSE: 2

## 2019-01-25 ASSESSMENT — PAIN DESCRIPTION - PAIN TYPE: TYPE: ACUTE PAIN

## 2019-01-25 ASSESSMENT — PAIN DESCRIPTION - LOCATION: LOCATION: CHEST

## 2019-01-26 PROBLEM — F32.2 SEVERE MAJOR DEPRESSION WITHOUT PSYCHOTIC FEATURES (HCC): Status: ACTIVE | Noted: 2019-01-26

## 2019-01-26 PROCEDURE — 6370000000 HC RX 637 (ALT 250 FOR IP): Performed by: NURSE PRACTITIONER

## 2019-01-26 PROCEDURE — 1240000000 HC EMOTIONAL WELLNESS R&B

## 2019-01-26 PROCEDURE — 99221 1ST HOSP IP/OBS SF/LOW 40: CPT | Performed by: NURSE PRACTITIONER

## 2019-01-26 RX ORDER — OLANZAPINE 5 MG/1
TABLET, ORALLY DISINTEGRATING ORAL
Status: COMPLETED
Start: 2019-01-26 | End: 2019-01-25

## 2019-01-26 RX ORDER — DIVALPROEX SODIUM 250 MG/1
250 TABLET, DELAYED RELEASE ORAL 3 TIMES DAILY
Status: DISCONTINUED | OUTPATIENT
Start: 2019-01-26 | End: 2019-01-28 | Stop reason: HOSPADM

## 2019-01-26 RX ORDER — MIRTAZAPINE 15 MG/1
45 TABLET, FILM COATED ORAL NIGHTLY
Status: DISCONTINUED | OUTPATIENT
Start: 2019-01-26 | End: 2019-01-28 | Stop reason: HOSPADM

## 2019-01-26 RX ORDER — ACYCLOVIR 200 MG/1
400 CAPSULE ORAL 3 TIMES DAILY
Status: DISCONTINUED | OUTPATIENT
Start: 2019-01-26 | End: 2019-01-28 | Stop reason: HOSPADM

## 2019-01-26 RX ORDER — MIRTAZAPINE 15 MG/1
30 TABLET, FILM COATED ORAL NIGHTLY
Status: DISCONTINUED | OUTPATIENT
Start: 2019-01-26 | End: 2019-01-26

## 2019-01-26 RX ADMIN — DIVALPROEX SODIUM 250 MG: 250 TABLET, DELAYED RELEASE ORAL at 13:52

## 2019-01-26 RX ADMIN — DIVALPROEX SODIUM 250 MG: 250 TABLET, DELAYED RELEASE ORAL at 21:01

## 2019-01-26 RX ADMIN — ACYCLOVIR 400 MG: 200 CAPSULE ORAL at 21:00

## 2019-01-26 RX ADMIN — MIRTAZAPINE 45 MG: 15 TABLET, FILM COATED ORAL at 21:00

## 2019-01-26 ASSESSMENT — SLEEP AND FATIGUE QUESTIONNAIRES
DIFFICULTY ARISING: NO
AVERAGE NUMBER OF SLEEP HOURS: 4
DO YOU USE A SLEEP AID: YES
DO YOU USE A SLEEP AID: YES
SLEEP PATTERN: DIFFICULTY FALLING ASLEEP;RESTLESSNESS
RESTFUL SLEEP: NO
DIFFICULTY STAYING ASLEEP: YES
SLEEP PATTERN: DIFFICULTY FALLING ASLEEP
DO YOU HAVE DIFFICULTY SLEEPING: YES
DIFFICULTY ARISING: YES
DIFFICULTY STAYING ASLEEP: YES
RESTFUL SLEEP: NO
DIFFICULTY FALLING ASLEEP: YES
AVERAGE NUMBER OF SLEEP HOURS: 5
DO YOU HAVE DIFFICULTY SLEEPING: YES
DIFFICULTY FALLING ASLEEP: YES

## 2019-01-26 ASSESSMENT — PATIENT HEALTH QUESTIONNAIRE - PHQ9
SUM OF ALL RESPONSES TO PHQ QUESTIONS 1-9: 11
SUM OF ALL RESPONSES TO PHQ QUESTIONS 1-9: 12

## 2019-01-26 ASSESSMENT — PAIN SCALES - GENERAL: PAINLEVEL_OUTOF10: 0

## 2019-01-26 ASSESSMENT — LIFESTYLE VARIABLES
HISTORY_ALCOHOL_USE: NO
HISTORY_ALCOHOL_USE: NO

## 2019-01-27 PROCEDURE — 1240000000 HC EMOTIONAL WELLNESS R&B

## 2019-01-27 PROCEDURE — 99231 SBSQ HOSP IP/OBS SF/LOW 25: CPT | Performed by: PSYCHIATRY & NEUROLOGY

## 2019-01-27 PROCEDURE — 6370000000 HC RX 637 (ALT 250 FOR IP): Performed by: NURSE PRACTITIONER

## 2019-01-27 RX ORDER — HYDROXYZINE PAMOATE 50 MG/1
50 CAPSULE ORAL EVERY 6 HOURS PRN
Status: DISCONTINUED | OUTPATIENT
Start: 2019-01-27 | End: 2019-01-28 | Stop reason: HOSPADM

## 2019-01-27 RX ADMIN — ACYCLOVIR 400 MG: 200 CAPSULE ORAL at 09:02

## 2019-01-27 RX ADMIN — ACYCLOVIR 400 MG: 200 CAPSULE ORAL at 20:43

## 2019-01-27 RX ADMIN — DIVALPROEX SODIUM 250 MG: 250 TABLET, DELAYED RELEASE ORAL at 09:02

## 2019-01-27 RX ADMIN — HYDROXYZINE PAMOATE 50 MG: 50 CAPSULE ORAL at 20:45

## 2019-01-27 RX ADMIN — DIVALPROEX SODIUM 250 MG: 250 TABLET, DELAYED RELEASE ORAL at 20:43

## 2019-01-27 RX ADMIN — ACYCLOVIR 400 MG: 200 CAPSULE ORAL at 13:38

## 2019-01-27 RX ADMIN — MIRTAZAPINE 45 MG: 15 TABLET, FILM COATED ORAL at 20:43

## 2019-01-27 RX ADMIN — DIVALPROEX SODIUM 250 MG: 250 TABLET, DELAYED RELEASE ORAL at 13:38

## 2019-01-28 VITALS
OXYGEN SATURATION: 99 % | RESPIRATION RATE: 14 BRPM | HEART RATE: 59 BPM | DIASTOLIC BLOOD PRESSURE: 79 MMHG | WEIGHT: 203 LBS | BODY MASS INDEX: 28.42 KG/M2 | SYSTOLIC BLOOD PRESSURE: 130 MMHG | TEMPERATURE: 98.1 F | HEIGHT: 71 IN

## 2019-01-28 PROCEDURE — 6370000000 HC RX 637 (ALT 250 FOR IP): Performed by: NURSE PRACTITIONER

## 2019-01-28 PROCEDURE — 99238 HOSP IP/OBS DSCHRG MGMT 30/<: CPT | Performed by: NURSE PRACTITIONER

## 2019-01-28 RX ORDER — MIRTAZAPINE 45 MG/1
45 TABLET, FILM COATED ORAL NIGHTLY
Qty: 30 TABLET | Refills: 0 | Status: SHIPPED | OUTPATIENT
Start: 2019-01-28 | End: 2019-03-11

## 2019-01-28 RX ORDER — DIVALPROEX SODIUM 250 MG/1
250 TABLET, DELAYED RELEASE ORAL 3 TIMES DAILY
Qty: 90 TABLET | Refills: 0 | Status: SHIPPED | OUTPATIENT
Start: 2019-01-28 | End: 2019-03-11 | Stop reason: DRUGHIGH

## 2019-01-28 RX ADMIN — DIVALPROEX SODIUM 250 MG: 250 TABLET, DELAYED RELEASE ORAL at 09:51

## 2019-01-28 RX ADMIN — ACYCLOVIR 400 MG: 200 CAPSULE ORAL at 09:51

## 2019-03-11 ENCOUNTER — HOSPITAL ENCOUNTER (EMERGENCY)
Age: 24
Discharge: HOME | End: 2019-03-11
Attending: EMERGENCY MEDICINE
Payer: COMMERCIAL

## 2019-03-11 VITALS
DIASTOLIC BLOOD PRESSURE: 65 MMHG | RESPIRATION RATE: 16 BRPM | BODY MASS INDEX: 37.13 KG/M2 | HEIGHT: 68 IN | SYSTOLIC BLOOD PRESSURE: 113 MMHG | OXYGEN SATURATION: 96 % | HEART RATE: 78 BPM | WEIGHT: 245 LBS | TEMPERATURE: 97.8 F

## 2019-03-11 DIAGNOSIS — R45.851 SUICIDAL IDEATION: Primary | ICD-10-CM

## 2019-03-11 LAB
ACETAMINOPHEN LEVEL: <5 MCG/ML (ref 10–30)
ALBUMIN SERPL-MCNC: 4.4 G/DL (ref 3.5–5.2)
ALP BLD-CCNC: 93 U/L (ref 40–129)
ALT SERPL-CCNC: 8 U/L (ref 0–40)
AMORPHOUS: ABNORMAL
AMPHETAMINE SCREEN, URINE: NOT DETECTED
ANION GAP SERPL CALCULATED.3IONS-SCNC: 13 MMOL/L (ref 7–16)
AST SERPL-CCNC: 12 U/L (ref 0–39)
BACTERIA: ABNORMAL /HPF
BARBITURATE SCREEN URINE: NOT DETECTED
BASOPHILS ABSOLUTE: 0.02 E9/L (ref 0–0.2)
BASOPHILS RELATIVE PERCENT: 0.2 % (ref 0–2)
BENZODIAZEPINE SCREEN, URINE: NOT DETECTED
BILIRUB SERPL-MCNC: 0.2 MG/DL (ref 0–1.2)
BILIRUBIN URINE: NEGATIVE
BLOOD, URINE: NEGATIVE
BUN BLDV-MCNC: 17 MG/DL (ref 6–20)
CALCIUM SERPL-MCNC: 8.7 MG/DL (ref 8.6–10.2)
CANNABINOID SCREEN URINE: NOT DETECTED
CHLORIDE BLD-SCNC: 105 MMOL/L (ref 98–107)
CLARITY: NORMAL
CO2: 25 MMOL/L (ref 22–29)
COCAINE METABOLITE SCREEN URINE: NOT DETECTED
COLOR: YELLOW
CREAT SERPL-MCNC: 0.7 MG/DL (ref 0.7–1.2)
EKG ATRIAL RATE: 89 BPM
EKG P AXIS: 39 DEGREES
EKG P-R INTERVAL: 154 MS
EKG Q-T INTERVAL: 352 MS
EKG QRS DURATION: 94 MS
EKG QTC CALCULATION (BAZETT): 428 MS
EKG R AXIS: 56 DEGREES
EKG T AXIS: 35 DEGREES
EKG VENTRICULAR RATE: 89 BPM
EOSINOPHILS ABSOLUTE: 0.06 E9/L (ref 0.05–0.5)
EOSINOPHILS RELATIVE PERCENT: 0.5 % (ref 0–6)
ETHANOL: <10 MG/DL (ref 0–0.08)
GFR AFRICAN AMERICAN: >60
GFR NON-AFRICAN AMERICAN: >60 ML/MIN/1.73
GLUCOSE BLD-MCNC: 94 MG/DL (ref 74–99)
GLUCOSE URINE: NEGATIVE MG/DL
HCT VFR BLD CALC: 43.2 % (ref 37–54)
HEMOGLOBIN: 14.8 G/DL (ref 12.5–16.5)
IMMATURE GRANULOCYTES #: 0.06 E9/L
IMMATURE GRANULOCYTES %: 0.5 % (ref 0–5)
KETONES, URINE: NEGATIVE MG/DL
LEUKOCYTE ESTERASE, URINE: NEGATIVE
LYMPHOCYTES ABSOLUTE: 2.09 E9/L (ref 1.5–4)
LYMPHOCYTES RELATIVE PERCENT: 18.8 % (ref 20–42)
MCH RBC QN AUTO: 32.3 PG (ref 26–35)
MCHC RBC AUTO-ENTMCNC: 34.3 % (ref 32–34.5)
MCV RBC AUTO: 94.3 FL (ref 80–99.9)
METHADONE SCREEN, URINE: NOT DETECTED
MONOCYTES ABSOLUTE: 0.62 E9/L (ref 0.1–0.95)
MONOCYTES RELATIVE PERCENT: 5.6 % (ref 2–12)
NEUTROPHILS ABSOLUTE: 8.25 E9/L (ref 1.8–7.3)
NEUTROPHILS RELATIVE PERCENT: 74.4 % (ref 43–80)
NITRITE, URINE: NEGATIVE
OPIATE SCREEN URINE: NOT DETECTED
PDW BLD-RTO: 12 FL (ref 11.5–15)
PH UA: 7 (ref 5–9)
PHENCYCLIDINE SCREEN URINE: NOT DETECTED
PLATELET # BLD: 261 E9/L (ref 130–450)
PMV BLD AUTO: 8.9 FL (ref 7–12)
POTASSIUM SERPL-SCNC: 4.2 MMOL/L (ref 3.5–5)
PROPOXYPHENE SCREEN: NOT DETECTED
PROTEIN UA: NEGATIVE MG/DL
RBC # BLD: 4.58 E12/L (ref 3.8–5.8)
RBC UA: ABNORMAL /HPF (ref 0–2)
SALICYLATE, SERUM: <0.3 MG/DL (ref 0–30)
SODIUM BLD-SCNC: 143 MMOL/L (ref 132–146)
SPECIFIC GRAVITY UA: 1.02 (ref 1–1.03)
TOTAL PROTEIN: 7.3 G/DL (ref 6.4–8.3)
TRICYCLIC ANTIDEPRESSANTS SCREEN SERUM: NEGATIVE NG/ML
UROBILINOGEN, URINE: 0.2 E.U./DL
VALPROIC ACID LEVEL: 23 MCG/ML (ref 50–100)
WBC # BLD: 11.1 E9/L (ref 4.5–11.5)
WBC UA: ABNORMAL /HPF (ref 0–5)

## 2019-03-11 PROCEDURE — 81001 URINALYSIS AUTO W/SCOPE: CPT

## 2019-03-11 PROCEDURE — 80053 COMPREHEN METABOLIC PANEL: CPT

## 2019-03-11 PROCEDURE — 36415 COLL VENOUS BLD VENIPUNCTURE: CPT

## 2019-03-11 PROCEDURE — 99284 EMERGENCY DEPT VISIT MOD MDM: CPT

## 2019-03-11 PROCEDURE — 80164 ASSAY DIPROPYLACETIC ACD TOT: CPT

## 2019-03-11 PROCEDURE — G0480 DRUG TEST DEF 1-7 CLASSES: HCPCS

## 2019-03-11 PROCEDURE — 80307 DRUG TEST PRSMV CHEM ANLYZR: CPT

## 2019-03-11 PROCEDURE — 93005 ELECTROCARDIOGRAM TRACING: CPT | Performed by: EMERGENCY MEDICINE

## 2019-03-11 PROCEDURE — 6370000000 HC RX 637 (ALT 250 FOR IP): Performed by: EMERGENCY MEDICINE

## 2019-03-11 PROCEDURE — 85025 COMPLETE CBC W/AUTO DIFF WBC: CPT

## 2019-03-11 RX ORDER — DIVALPROEX SODIUM 500 MG/1
TABLET, DELAYED RELEASE ORAL
Refills: 1 | COMMUNITY
Start: 2019-02-25

## 2019-03-11 RX ORDER — OLANZAPINE 20 MG/1
TABLET ORAL
Refills: 1 | COMMUNITY
Start: 2019-02-25

## 2019-03-11 RX ORDER — CLONAZEPAM 0.5 MG/1
TABLET ORAL
Refills: 1 | COMMUNITY
Start: 2019-02-25

## 2019-03-11 RX ORDER — MIRTAZAPINE 15 MG/1
45 TABLET, FILM COATED ORAL ONCE
Status: COMPLETED | OUTPATIENT
Start: 2019-03-11 | End: 2019-03-11

## 2019-03-11 RX ORDER — DIVALPROEX SODIUM 250 MG/1
250 TABLET, DELAYED RELEASE ORAL ONCE
Status: COMPLETED | OUTPATIENT
Start: 2019-03-11 | End: 2019-03-11

## 2019-03-11 RX ORDER — PREDNISOLONE ACETATE 10 MG/ML
SUSPENSION/ DROPS OPHTHALMIC
Refills: 5 | COMMUNITY
Start: 2019-02-28

## 2019-03-11 RX ADMIN — MIRTAZAPINE 45 MG: 15 TABLET, FILM COATED ORAL at 04:16

## 2019-03-11 RX ADMIN — DIVALPROEX SODIUM 250 MG: 250 TABLET, DELAYED RELEASE ORAL at 04:16

## 2019-06-07 ENCOUNTER — OFFICE VISIT (OUTPATIENT)
Dept: FAMILY MEDICINE CLINIC | Age: 24
End: 2019-06-07
Payer: COMMERCIAL

## 2019-06-07 ENCOUNTER — HOSPITAL ENCOUNTER (INPATIENT)
Age: 24
LOS: 6 days | Discharge: HOME | DRG: 753 | End: 2019-06-14
Attending: EMERGENCY MEDICINE | Admitting: PSYCHIATRY & NEUROLOGY
Payer: COMMERCIAL

## 2019-06-07 VITALS
DIASTOLIC BLOOD PRESSURE: 68 MMHG | WEIGHT: 248 LBS | RESPIRATION RATE: 18 BRPM | HEIGHT: 68 IN | HEART RATE: 86 BPM | OXYGEN SATURATION: 98 % | BODY MASS INDEX: 37.59 KG/M2 | SYSTOLIC BLOOD PRESSURE: 113 MMHG

## 2019-06-07 DIAGNOSIS — R45.850 HOMICIDAL THOUGHTS: ICD-10-CM

## 2019-06-07 DIAGNOSIS — R45.850 HOMICIDAL IDEATION: Primary | ICD-10-CM

## 2019-06-07 DIAGNOSIS — Z09 FOLLOW UP: Primary | ICD-10-CM

## 2019-06-07 LAB
ACETAMINOPHEN LEVEL: <5 MCG/ML (ref 10–30)
AMPHETAMINE SCREEN, URINE: NOT DETECTED
ANION GAP SERPL CALCULATED.3IONS-SCNC: 10 MMOL/L (ref 7–16)
BARBITURATE SCREEN URINE: NOT DETECTED
BASOPHILS ABSOLUTE: 0.01 E9/L (ref 0–0.2)
BASOPHILS RELATIVE PERCENT: 0.1 % (ref 0–2)
BENZODIAZEPINE SCREEN, URINE: NOT DETECTED
BILIRUBIN URINE: NEGATIVE
BLOOD, URINE: NEGATIVE
BUN BLDV-MCNC: 10 MG/DL (ref 6–20)
CALCIUM SERPL-MCNC: 9 MG/DL (ref 8.6–10.2)
CANNABINOID SCREEN URINE: NOT DETECTED
CHLORIDE BLD-SCNC: 106 MMOL/L (ref 98–107)
CLARITY: CLEAR
CO2: 27 MMOL/L (ref 22–29)
COCAINE METABOLITE SCREEN URINE: NOT DETECTED
COLOR: YELLOW
CREAT SERPL-MCNC: 0.8 MG/DL (ref 0.7–1.2)
EOSINOPHILS ABSOLUTE: 0.15 E9/L (ref 0.05–0.5)
EOSINOPHILS RELATIVE PERCENT: 2.2 % (ref 0–6)
ETHANOL: <10 MG/DL (ref 0–0.08)
GFR AFRICAN AMERICAN: >60
GFR NON-AFRICAN AMERICAN: >60 ML/MIN/1.73
GLUCOSE BLD-MCNC: 92 MG/DL (ref 74–99)
GLUCOSE URINE: NEGATIVE MG/DL
HCT VFR BLD CALC: 44.3 % (ref 37–54)
HEMOGLOBIN: 14.9 G/DL (ref 12.5–16.5)
IMMATURE GRANULOCYTES #: 0.03 E9/L
IMMATURE GRANULOCYTES %: 0.4 % (ref 0–5)
KETONES, URINE: NEGATIVE MG/DL
LEUKOCYTE ESTERASE, URINE: NEGATIVE
LYMPHOCYTES ABSOLUTE: 2 E9/L (ref 1.5–4)
LYMPHOCYTES RELATIVE PERCENT: 29.6 % (ref 20–42)
MCH RBC QN AUTO: 31.5 PG (ref 26–35)
MCHC RBC AUTO-ENTMCNC: 33.6 % (ref 32–34.5)
MCV RBC AUTO: 93.7 FL (ref 80–99.9)
METHADONE SCREEN, URINE: NOT DETECTED
MONOCYTES ABSOLUTE: 0.45 E9/L (ref 0.1–0.95)
MONOCYTES RELATIVE PERCENT: 6.7 % (ref 2–12)
NEUTROPHILS ABSOLUTE: 4.11 E9/L (ref 1.8–7.3)
NEUTROPHILS RELATIVE PERCENT: 61 % (ref 43–80)
NITRITE, URINE: NEGATIVE
OPIATE SCREEN URINE: NOT DETECTED
PDW BLD-RTO: 11.9 FL (ref 11.5–15)
PH UA: 7.5 (ref 5–9)
PHENCYCLIDINE SCREEN URINE: NOT DETECTED
PLATELET # BLD: 258 E9/L (ref 130–450)
PMV BLD AUTO: 9.1 FL (ref 7–12)
POTASSIUM SERPL-SCNC: 3.9 MMOL/L (ref 3.5–5)
PROPOXYPHENE SCREEN: NOT DETECTED
PROTEIN UA: NEGATIVE MG/DL
RBC # BLD: 4.73 E12/L (ref 3.8–5.8)
SALICYLATE, SERUM: <0.3 MG/DL (ref 0–30)
SODIUM BLD-SCNC: 143 MMOL/L (ref 132–146)
SPECIFIC GRAVITY UA: 1.01 (ref 1–1.03)
TRICYCLIC ANTIDEPRESSANTS SCREEN SERUM: NEGATIVE NG/ML
UROBILINOGEN, URINE: 0.2 E.U./DL
WBC # BLD: 6.8 E9/L (ref 4.5–11.5)

## 2019-06-07 PROCEDURE — 85025 COMPLETE CBC W/AUTO DIFF WBC: CPT

## 2019-06-07 PROCEDURE — 81003 URINALYSIS AUTO W/O SCOPE: CPT

## 2019-06-07 PROCEDURE — 1036F TOBACCO NON-USER: CPT | Performed by: FAMILY MEDICINE

## 2019-06-07 PROCEDURE — 80307 DRUG TEST PRSMV CHEM ANLYZR: CPT

## 2019-06-07 PROCEDURE — 80048 BASIC METABOLIC PNL TOTAL CA: CPT

## 2019-06-07 PROCEDURE — 36415 COLL VENOUS BLD VENIPUNCTURE: CPT

## 2019-06-07 PROCEDURE — 99285 EMERGENCY DEPT VISIT HI MDM: CPT

## 2019-06-07 PROCEDURE — G0480 DRUG TEST DEF 1-7 CLASSES: HCPCS

## 2019-06-07 PROCEDURE — G8427 DOCREV CUR MEDS BY ELIG CLIN: HCPCS | Performed by: FAMILY MEDICINE

## 2019-06-07 PROCEDURE — G8417 CALC BMI ABV UP PARAM F/U: HCPCS | Performed by: FAMILY MEDICINE

## 2019-06-07 PROCEDURE — 99213 OFFICE O/P EST LOW 20 MIN: CPT | Performed by: FAMILY MEDICINE
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
## 2019-06-07 NOTE — ED NOTES
CALL TO ACCESS CENTER TO REFER THE PT AND SPOKE TO [address].      [facility]  06/07/19 1981

## 2019-06-07 NOTE — ED NOTES
Bed: HE  Expected date:   Expected time:   Means of arrival:   Comments:  ems     Corinne Olivia, RN  06/07/19 6519
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
## 2019-06-07 NOTE — ED PROVIDER NOTES
Department of Emergency Medicine   ED  Provider Note  Admit Date/RoomTime: 6/7/2019  1:10 PM  ED Room: Brooks Story              6/7/19  2:39 PM    HPI: Alina Macdonald 21 y.o. male presents with a complaint of homicidal ideation beginning a few weeks ago. Complaint has been constant and became more severe today which is what prompted the visit. Says he is angry with his group home staff. Says he wants to hurt them. Denies SI.     Group home staff was notified and they know as they sent him in      Review of Systems:   Pertinent positives and negatives are stated within HPI, all other systems reviewed and are negative.        --------------------------------------------- PAST HISTORY ---------------------------------------------  Past Medical History:  has a past medical history of ADHD, Anxiety, Depression, Fetal alcohol syndrome, Seizure disorder ([facility] [address] and Tourettes syndrome. Past Surgical History:  has a past surgical history that includes Cleft palate repair; Cleft lip repair; Colonoscopy (2017); and Endoscopy, colon, diagnostic (2017). Social History:  reports that he has quit smoking. He has never used smokeless tobacco. He reports that he does not drink alcohol or use drugs. Family History: family history includes No Known Problems in his father, mother, sister, and sister. The patients home medications have been reviewed. Allergies: Patient has no known allergies. -------------------------------------------------- RESULTS -------------------------------------------------  All laboratory and imaging studies were reviewed by myself.     LABS:  Results for orders placed or performed during the hospital encounter of 06/07/19   CBC Auto Differential   Result Value Ref Range    WBC 6.8 4.5 - 11.5 E9/L    RBC 4.73 3.80 - 5.80 E12/L    Hemoglobin 14.9 12.5 - 16.5 g/dL    Hematocrit 44.3 37.0 - 54.0 %    MCV 93.7 80.0 - 99.9 fL    MCH 31.5 26.0 - 35.0 pg    MCHC 33.6 32.0 - 34.5 %    RDW 11.9 11.5 - 15.0 fL    Platelets 562 412 - 853 E9/L    MPV 9.1 7.0 - 12.0 fL    Neutrophils % 61.0 43.0 - 80.0 %    Immature Granulocytes % 0.4 0.0 - 5.0 %    Lymphocytes % 29.6 20.0 - 42.0 %    Monocytes % 6.7 2.0 - 12.0 %    Eosinophils % 2.2 0.0 - 6.0 %    Basophils % 0.1 0.0 - 2.0 %    Neutrophils # 4.11 1.80 - 7.30 E9/L    Immature Granulocytes # 0.03 E9/L    Lymphocytes # 2.00 1.50 - 4.00 E9/L    Monocytes # 0.45 0.10 - 0.95 E9/L    Eosinophils # 0.15 0.05 - 0.50 E9/L    Basophils # 0.01 0.00 - 0.20 T7/F   Basic Metabolic Panel   Result Value Ref Range    Sodium 143 132 - 146 mmol/L    Potassium 3.9 3.5 - 5.0 mmol/L    Chloride 106 98 - 107 mmol/L    CO2 27 22 - 29 mmol/L    Anion Gap 10 7 - 16 mmol/L    Glucose 92 74 - 99 mg/dL    BUN 10 6 - 20 mg/dL    CREATININE 0.8 0.7 - 1.2 mg/dL    GFR Non-African American >60 >=60 mL/min/1.73    GFR African American >60     Calcium 9.0 8.6 - 10.2 mg/dL   Urinalysis   Result Value Ref Range    Color, UA Yellow Straw/Yellow    Clarity, UA Clear Clear    Glucose, Ur Negative Negative mg/dL    Bilirubin Urine Negative Negative    Ketones, Urine Negative Negative mg/dL    Specific Gravity, UA 1.010 1.005 - 1.030    Blood, Urine Negative Negative    pH, UA 7.5 5.0 - 9.0    Protein, UA Negative Negative mg/dL    Urobilinogen, Urine 0.2 <2.0 E.U./dL    Nitrite, Urine Negative Negative    Leukocyte Esterase, Urine Negative Negative   Serum Drug Screen   Result Value Ref Range    Ethanol Lvl <10 mg/dL    Acetaminophen Level <5.0 (L) 10.0 - 10.3 mcg/mL    Salicylate, Serum <0.2 0.0 - 30.0 mg/dL    TCA Scrn NEGATIVE Cutoff:300 ng/mL       RADIOLOGY:  Interpreted by Radiologist.  No orders to display          ------------------------- NURSING NOTES AND VITALS REVIEWED ---------------------------   The nursing notes within the ED encounter and vital signs as below have been reviewed.    /82   Pulse 77   Temp 97.2 °F (36.2 °C) (Temporal)   Resp 16   Ht 5' 8\" (1.727 m)   Wt 248 lb MD  06/07/19 1539

## 2019-06-08 PROBLEM — F33.2 SEVERE EPISODE OF RECURRENT MAJOR DEPRESSIVE DISORDER, WITHOUT PSYCHOTIC FEATURES (HCC): Status: ACTIVE | Noted: 2019-06-08

## 2019-06-08 PROCEDURE — 6370000000 HC RX 637 (ALT 250 FOR IP): Performed by: NURSE PRACTITIONER

## 2019-06-08 PROCEDURE — 1240000000 HC EMOTIONAL WELLNESS R&B

## 2019-06-08 PROCEDURE — 6370000000 HC RX 637 (ALT 250 FOR IP): Performed by: EMERGENCY MEDICINE

## 2019-06-08 RX ORDER — MAGNESIUM HYDROXIDE/ALUMINUM HYDROXICE/SIMETHICONE 120; 1200; 1200 MG/30ML; MG/30ML; MG/30ML
30 SUSPENSION ORAL PRN
Status: DISCONTINUED | OUTPATIENT
Start: 2019-06-08 | End: 2019-06-14 | Stop reason: HOSPADM

## 2019-06-08 RX ORDER — ACETAMINOPHEN 500 MG
1000 TABLET ORAL ONCE
Status: COMPLETED | OUTPATIENT
Start: 2019-06-08 | End: 2019-06-08

## 2019-06-08 RX ORDER — OLANZAPINE 10 MG/1
10 TABLET ORAL
Status: ACTIVE | OUTPATIENT
Start: 2019-06-08 | End: 2019-06-08

## 2019-06-08 RX ORDER — PREDNISOLONE ACETATE 10 MG/ML
1 SUSPENSION/ DROPS OPHTHALMIC 3 TIMES DAILY
Status: DISCONTINUED | OUTPATIENT
Start: 2019-06-08 | End: 2019-06-14 | Stop reason: HOSPADM

## 2019-06-08 RX ORDER — OLANZAPINE 10 MG/1
20 TABLET ORAL NIGHTLY
Status: DISCONTINUED | OUTPATIENT
Start: 2019-06-08 | End: 2019-06-14 | Stop reason: HOSPADM

## 2019-06-08 RX ORDER — ACETAMINOPHEN 500 MG
TABLET ORAL
Status: DISCONTINUED
Start: 2019-06-08 | End: 2019-06-08

## 2019-06-08 RX ORDER — CLONAZEPAM 0.5 MG/1
0.5 TABLET ORAL 3 TIMES DAILY PRN
Status: DISCONTINUED | OUTPATIENT
Start: 2019-06-08 | End: 2019-06-14 | Stop reason: HOSPADM

## 2019-06-08 RX ORDER — DIVALPROEX SODIUM 250 MG/1
500 TABLET, DELAYED RELEASE ORAL EVERY 8 HOURS SCHEDULED
Status: DISCONTINUED | OUTPATIENT
Start: 2019-06-08 | End: 2019-06-09

## 2019-06-08 RX ORDER — HALOPERIDOL 5 MG/ML
10 INJECTION INTRAMUSCULAR EVERY 6 HOURS PRN
Status: DISCONTINUED | OUTPATIENT
Start: 2019-06-08 | End: 2019-06-14 | Stop reason: HOSPADM

## 2019-06-08 RX ORDER — BENZTROPINE MESYLATE 1 MG/ML
2 INJECTION INTRAMUSCULAR; INTRAVENOUS 2 TIMES DAILY PRN
Status: DISCONTINUED | OUTPATIENT
Start: 2019-06-08 | End: 2019-06-14 | Stop reason: HOSPADM

## 2019-06-08 RX ORDER — HYDROXYZINE PAMOATE 25 MG/1
50 CAPSULE ORAL EVERY 6 HOURS PRN
Status: DISCONTINUED | OUTPATIENT
Start: 2019-06-08 | End: 2019-06-14 | Stop reason: HOSPADM

## 2019-06-08 RX ORDER — TRAZODONE HYDROCHLORIDE 50 MG/1
50 TABLET ORAL NIGHTLY PRN
Status: DISCONTINUED | OUTPATIENT
Start: 2019-06-08 | End: 2019-06-14 | Stop reason: HOSPADM

## 2019-06-08 RX ORDER — ACETAMINOPHEN 325 MG/1
650 TABLET ORAL EVERY 4 HOURS PRN
Status: DISCONTINUED | OUTPATIENT
Start: 2019-06-08 | End: 2019-06-14 | Stop reason: HOSPADM

## 2019-06-08 RX ADMIN — DIVALPROEX SODIUM 500 MG: 250 TABLET, DELAYED RELEASE ORAL at 17:37

## 2019-06-08 RX ADMIN — ACETAMINOPHEN 1000 MG: 500 TABLET ORAL at 17:36

## 2019-06-08 RX ADMIN — OLANZAPINE 20 MG: 10 TABLET, FILM COATED ORAL at 20:58

## 2019-06-08 RX ADMIN — PREDNISOLONE ACETATE 1 DROP: 10 SUSPENSION/ DROPS OPHTHALMIC at 17:38

## 2019-06-08 RX ADMIN — ACETAMINOPHEN 1000 MG: 500 TABLET ORAL at 09:55

## 2019-06-08 RX ADMIN — PREDNISOLONE ACETATE 1 DROP: 10 SUSPENSION/ DROPS OPHTHALMIC at 20:58

## 2019-06-08 ASSESSMENT — ENCOUNTER SYMPTOMS
DIARRHEA: 0
COUGH: 0
EYE PAIN: 0
SINUS PRESSURE: 0
BACK PAIN: 0
WHEEZING: 0
SORE THROAT: 0
ABDOMINAL PAIN: 0
NAUSEA: 0
SHORTNESS OF BREATH: 0
VOMITING: 0
EYE REDNESS: 0
EYE DISCHARGE: 0

## 2019-06-08 ASSESSMENT — SLEEP AND FATIGUE QUESTIONNAIRES
DIFFICULTY STAYING ASLEEP: NO
DO YOU HAVE DIFFICULTY SLEEPING: NO
RESTFUL SLEEP: YES
DIFFICULTY FALLING ASLEEP: NO
AVERAGE NUMBER OF SLEEP HOURS: 8
DIFFICULTY ARISING: NO
DO YOU USE A SLEEP AID: YES

## 2019-06-08 ASSESSMENT — PAIN SCALES - GENERAL
PAINLEVEL_OUTOF10: 6
PAINLEVEL_OUTOF10: 5
PAINLEVEL_OUTOF10: 8

## 2019-06-08 ASSESSMENT — LIFESTYLE VARIABLES: HISTORY_ALCOHOL_USE: NO
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
## 2019-06-08 NOTE — ED NOTES
Notified dr Jacquenette Layer of need for admission pt accepted to [address] [place] [address].        Mendel Nipper, RN  06/08/19 4423

## 2019-06-08 NOTE — ED NOTES
Patient belongings placed in locker 31 at this time     Karlene Amos [facility]  06/08/19 7714

## 2019-06-08 NOTE — ED NOTES
BED IS AVAILABLE AT THIS FACILITY     DR. Susannah Damon ACCEPTED TO 7S    CALLED ADMITTING AND SPOKE TO GEE TO ASSIGN BED 7511    FAXED PINK SLIP TO 7S    N2N [facility]  06/08/19 1320

## 2019-06-08 NOTE — ED NOTES
Pt's mom from [facility] (?) called via [employer] and I was able to get the call transferred so that he can talk to her.           Janet Cruz, VALERY  06/08/19 1400

## 2019-06-08 NOTE — PROGRESS NOTES
roadblocks)                    ( )  Coping skills (new ways to manage stress, exercise, relaxation techniques, changing routine, distraction)                                                           ( )  Basic information about quitting (benefits of quitting, techniques in how to quit, available resources  ( ) Referral for counseling faxed to [facility]                                           ( ) Patient refused counseling  ( ) Patient has not smoked in the last 30 days    Metabolic Screening:    Lab Results   Component Value Date    LABA1C 5.5 11/16/2018       Lab Results   Component Value Date    CHOL 144 11/16/2018    CHOL 169 11/28/2017     Lab Results   Component Value Date    TRIG 141 11/16/2018    TRIG 128 11/28/2017     Lab Results   Component Value Date    HDL 41 11/16/2018    HDL 36 11/28/2017     No components found for: LDLCAL  Lab Results   Component Value Date    LABVLDL 28 11/16/2018    LABVLDL 26 11/28/2017         Body mass index is 37.71 kg/m². BP Readings from Last 2 Encounters:   06/08/19 (!) 136/90   06/07/19 113/68           Pt admitted with followings belongings:  Dentures: None  Vision - Corrective Lenses: None  Hearing Aid: None  Jewelry: Bracelet(Bracelets x10)  Body Piercings Removed: N/A  Clothing: Shirt, Footwear, Pants(Hat,Belts)  Were All Patient Medications Collected?: Not Applicable  Other Valuables: Wallet, Other (Comment)(, Headphones, Ipod)     . Patient oriented to surroundings and program expectations and copy of patient rights given. Received admission packet:  . Consents reviewed, signed . Jeannette Martins Patient verbalize understanding:  .     Patient education on precautions:                    Kayla Anne RN
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
## 2019-06-09 LAB — VALPROIC ACID LEVEL: 62 MCG/ML (ref 50–100)

## 2019-06-09 PROCEDURE — 6370000000 HC RX 637 (ALT 250 FOR IP): Performed by: PSYCHIATRY & NEUROLOGY

## 2019-06-09 PROCEDURE — 1240000000 HC EMOTIONAL WELLNESS R&B

## 2019-06-09 PROCEDURE — 99222 1ST HOSP IP/OBS MODERATE 55: CPT | Performed by: NURSE PRACTITIONER

## 2019-06-09 PROCEDURE — 6370000000 HC RX 637 (ALT 250 FOR IP): Performed by: NURSE PRACTITIONER

## 2019-06-09 PROCEDURE — 80164 ASSAY DIPROPYLACETIC ACD TOT: CPT

## 2019-06-09 PROCEDURE — 36415 COLL VENOUS BLD VENIPUNCTURE: CPT

## 2019-06-09 RX ORDER — DIVALPROEX SODIUM 500 MG/1
500 TABLET, DELAYED RELEASE ORAL 3 TIMES DAILY
Status: DISCONTINUED | OUTPATIENT
Start: 2019-06-09 | End: 2019-06-14 | Stop reason: HOSPADM

## 2019-06-09 RX ADMIN — DIVALPROEX SODIUM 500 MG: 500 TABLET, DELAYED RELEASE ORAL at 21:01

## 2019-06-09 RX ADMIN — PREDNISOLONE ACETATE 1 DROP: 10 SUSPENSION/ DROPS OPHTHALMIC at 14:36

## 2019-06-09 RX ADMIN — DIVALPROEX SODIUM 500 MG: 500 TABLET, DELAYED RELEASE ORAL at 09:25

## 2019-06-09 RX ADMIN — PREDNISOLONE ACETATE 1 DROP: 10 SUSPENSION/ DROPS OPHTHALMIC at 09:25

## 2019-06-09 RX ADMIN — DIVALPROEX SODIUM 500 MG: 500 TABLET, DELAYED RELEASE ORAL at 14:36

## 2019-06-09 RX ADMIN — OLANZAPINE 20 MG: 10 TABLET, FILM COATED ORAL at 21:01

## 2019-06-09 RX ADMIN — PREDNISOLONE ACETATE 1 DROP: 10 SUSPENSION/ DROPS OPHTHALMIC at 21:01

## 2019-06-09 RX ADMIN — SERTRALINE 25 MG: 50 TABLET, FILM COATED ORAL at 09:25

## 2019-06-09 RX ADMIN — DIVALPROEX SODIUM 500 MG: 250 TABLET, DELAYED RELEASE ORAL at 00:13

## 2019-06-09 ASSESSMENT — SLEEP AND FATIGUE QUESTIONNAIRES
DIFFICULTY ARISING: NO
SLEEP PATTERN: NORMAL
DO YOU HAVE DIFFICULTY SLEEPING: NO
DIFFICULTY STAYING ASLEEP: NO
DIFFICULTY FALLING ASLEEP: NO
AVERAGE NUMBER OF SLEEP HOURS: 8
DO YOU USE A SLEEP AID: YES
RESTFUL SLEEP: YES

## 2019-06-09 ASSESSMENT — LIFESTYLE VARIABLES: HISTORY_ALCOHOL_USE: NO

## 2019-06-09 NOTE — PLAN OF CARE
Patient continues to present isolative and seclusive. Patient does not have any behavioral issues on the floor and makes needs known. Patient is free from behavioral disturbances and has not required any PRN's. Patient however, continues to report suicidal and homicidal thoughts. Patient reports that he is homicidal towards the director at [employer]. He reports that he does not like how often the director watches him, but states \"he watches me because I have tried to escape multiple times. \" Patient plans on getting a gun and shooting the director and then shooting himself in attempts to commit suicide. Patient denies AVH. Presents anxious when around others and presents depressed.  Will monitor closely

## 2019-06-09 NOTE — PROGRESS NOTES
Spoke with guardian. She feels that he is simply seeking attention, wishes that I would not admit him. I did explain that I feel uncomfortable with him going home. I explained that he will be transferred to [facility] in [facility].       Says to please call his local psychiatrist.

## 2019-06-09 NOTE — PLAN OF CARE
Problem: Anger Management/Homicidal Ideation:  Goal: Absence of homicidal ideation  Description  Absence of homicidal ideation  Outcome: Ongoing     Problem: Depressive Behavior With or Without Suicide Precautions:  Goal: Ability to disclose and discuss suicidal ideas will improve  Description  Ability to disclose and discuss suicidal ideas will improve  Outcome: Ongoing   Pt has been out and about the unit and is social and bright with peers. Is pleasant and cooperative on approach and states that he continues to have suicidal and homicidal ideations but that they are getting less.

## 2019-06-09 NOTE — PROGRESS NOTES
Out watching TV behavior good control tells me he was having strange feelings inside at [facility] denies any HI or anger towards [facility] manager which he reported earlier reviewed meds suppot given

## 2019-06-09 NOTE — PROGRESS NOTES
Patient is here today for follow up ED visit:    He was recently seen in the emergency room for swelling in his right eye. In the ER he was diagnosed with early sinusitis and was given pseudoephedrine and was discharged home. Patient is doing better today. He has no lingering facial swelling he continues to improve. He denies any fevers or chills. Patient has no other complaints. Patient's past medical, surgical, social and/or family history reviewed, updated in chart, and are non-contributory (unless otherwise stated). Medications and allergies also reviewed and updated in chart. /68   Pulse 86   Resp 18   Ht 5' 8\" (1.727 m)   Wt 248 lb (112.5 kg)   SpO2 98%   BMI 37.71 kg/m²     Review of Systems   Constitutional: Negative for chills and fever. HENT: Negative for ear pain, sinus pressure and sore throat. Eyes: Negative for pain, discharge and redness. Respiratory: Negative for cough, shortness of breath and wheezing. Cardiovascular: Negative for chest pain. Gastrointestinal: Negative for abdominal pain, diarrhea, nausea and vomiting. Genitourinary: Negative for dysuria and frequency. Musculoskeletal: Negative for arthralgias and back pain. Skin: Negative for rash and wound. Neurological: Negative for weakness and headaches. Hematological: Negative for adenopathy. All other systems reviewed and are negative. Physical Exam   Constitutional: He is oriented to person, place, and time. No distress. HENT:   Head: Normocephalic and atraumatic. Eyes: Pupils are equal, round, and reactive to light. EOM are normal.   Neck: Normal range of motion. Neck supple. Cardiovascular: Normal rate and regular rhythm. Exam reveals no gallop and no friction rub. No murmur heard. Pulmonary/Chest: Effort normal and breath sounds normal. He has no wheezes. He has no rales. Abdominal: Soft. Bowel sounds are normal. There is tenderness. There is no rebound.

## 2019-06-10 PROCEDURE — 1240000000 HC EMOTIONAL WELLNESS R&B

## 2019-06-10 PROCEDURE — 6370000000 HC RX 637 (ALT 250 FOR IP): Performed by: NURSE PRACTITIONER

## 2019-06-10 PROCEDURE — 6370000000 HC RX 637 (ALT 250 FOR IP): Performed by: PSYCHIATRY & NEUROLOGY

## 2019-06-10 PROCEDURE — 99231 SBSQ HOSP IP/OBS SF/LOW 25: CPT | Performed by: NURSE PRACTITIONER

## 2019-06-10 RX ADMIN — DIVALPROEX SODIUM 500 MG: 500 TABLET, DELAYED RELEASE ORAL at 09:48

## 2019-06-10 RX ADMIN — PREDNISOLONE ACETATE 1 DROP: 10 SUSPENSION/ DROPS OPHTHALMIC at 13:30

## 2019-06-10 RX ADMIN — SERTRALINE 25 MG: 50 TABLET, FILM COATED ORAL at 09:48

## 2019-06-10 RX ADMIN — PREDNISOLONE ACETATE 1 DROP: 10 SUSPENSION/ DROPS OPHTHALMIC at 09:49

## 2019-06-10 RX ADMIN — OLANZAPINE 20 MG: 10 TABLET, FILM COATED ORAL at 20:28

## 2019-06-10 RX ADMIN — DIVALPROEX SODIUM 500 MG: 500 TABLET, DELAYED RELEASE ORAL at 20:28

## 2019-06-10 RX ADMIN — PREDNISOLONE ACETATE 1 DROP: 10 SUSPENSION/ DROPS OPHTHALMIC at 20:29

## 2019-06-10 RX ADMIN — HYDROXYZINE PAMOATE 50 MG: 25 CAPSULE ORAL at 10:13

## 2019-06-10 RX ADMIN — DIVALPROEX SODIUM 500 MG: 500 TABLET, DELAYED RELEASE ORAL at 13:30

## 2019-06-10 RX ADMIN — ACETAMINOPHEN 650 MG: 325 TABLET, FILM COATED ORAL at 14:14

## 2019-06-10 ASSESSMENT — PAIN SCALES - GENERAL: PAINLEVEL_OUTOF10: 4

## 2019-06-10 NOTE — GROUP NOTE
Group Therapy Note    Date: Nano 10    Group Start Time: 1000  Group End Time: 5913  Group Topic: Psychoeducation    [facility] ACUTE BH 1    Marissa Winn, CTRS        Group Therapy Note    Number of participants: 18  Type of group: Psychoeducation  Mode of intervention: Education, Support, Socialization, Exploration, Clarifying, Problem-solving and Activity  Topic: [facility]  Objective:  Patient will identify ways to express their emotions in a positive way. Notes:  Patient was interactive during group sharing ways to express emotions in a positive way. Patient gave support and feedback to others. Status After Intervention:  Improved    Participation Level:  Active Listener and Interactive    Participation Quality: Appropriate, Attentive, Sharing and Supportive      Speech:  normal      Thought Process/Content: Logical      Affective Functioning: Congruent      Mood: euthymic      Level of consciousness:  Alert, Oriented x4 and Attentive      Response to Learning: Able to verbalize current knowledge/experience, Able to verbalize/acknowledge new learning, Able to retain information, Capable of insight, Able to change behavior and Progressing to goal      Endings: None Reported    Modes of Intervention: Education, Support, Socialization, Exploration, Clarifying, Problem-solving and Activity

## 2019-06-10 NOTE — CARE COORDINATION
Phone call to Patience Saint at [phone], who stated that PT IS UNABLE TO RETURN UPON D/C    Pts NEYMAR- Geovanny Shah can be reached at [phone] to discuss d/c plans. He would like to call her first before sw speaking with her. Advised latonya to give pts mother a message to call sw back to also discuss d/c plans.

## 2019-06-10 NOTE — PROGRESS NOTES
DATE OF SERVICE:     6/10/2019    Elijah He seen today for the purpose of continuation of care. Nursing, social work reports, laboratory studies and vital signs are reviewed. Patient chief complaint today is:             [x] Depression      [x] Anxiety        [x] Psychosis         [x] Suicidal/Homicidal                         [x] Delusions           [] Aggression          Subjective: Today patient states that he is still feels like killing director of [address] [place] [address]. Feels paranoid. Feels depressed, anxiety, AVH demons telling him to kill director. Med compliant and goes to groups. No issues on unit. Sleep:  [x] Good [] Fair  [] Poor  Appetite:  [x] Good [] Fair  [] Poor    Depression:  [] Mild [] Moderate [x] Severe                [x] Constant [] Sporadic     Anxiety: [] Mild [] Moderate [x] Severe    [x] Constant [] Sporadic     Delusions: [] Mild [] Moderate [x] Severe     [x] Constant [] Sporadic     [x] Paranoid [] Somatic [] Grandiose     Hallucinations: [] Mild [] Moderate [x] Severe     [] Constant [x] Sporadic    [x] Auditory  [x] Visual [] Tactile       Suicidal: [] Constant [x] Sporadic  Homicidal: [] Constant [x] Sporadic    Unscheduled Medications     [] Patient Receiving Emergency Medications \" Chemical Restraint\"   [] Requesting PRN medications for anxiety    Medical Review of Systems:     All other than marked systmes have been reviewed and are all negative.     Constitutional Symptoms: []  fever []  Chills  Skin Symptoms: [] rash []  Pruritus   Eye Symptoms: [] Vision unchanged []  recent vision problems[] blurred vision   Respiratory Symptoms:[] shortness of breath [] cough  Cardiovascular Symptoms:  [] chest pain   [] palpitations   Gastrointestinal Symptoms: []  abdominal pain []  nausea []  vomiting []  diarrhea  Genitourinary Symptoms: []  dysuria  []  hematuria   Musculoskeletal Symptoms: []  back pain []  muscle pain []  joint pain  Neurologic Symptoms: []  headache [] dizziness  Hematolymphoid Symptoms: [] Adenopathy [] Bruises   [] Schimosis       Psychiatric Review of systems  Delusions:  [] Denies [] Endorses   Withdrawals:  [] Denies [] Endorses    Hallucinations: [] Denies [] Endorses    Extra Pyramidal Symptoms: [] Denies [] Endorses      /78   Pulse 80   Temp 98.4 °F (36.9 °C) (Oral)   Resp 16   Ht 5' 8\" (1.727 m)   Wt 248 lb (112.5 kg)   SpO2 99%   BMI 37.71 kg/m²     MENTAL STATUS EXAM:         Cognition:       [x] Alert  [x] Awake  [x] Oriented  [x] Person  [x] Place [x] Time       [] drowsy  [] tired  [] lethargic  [] distractable      Attention/Concentration:   [x] Attentive  [] Distracted         Memory Recent and Remote: [] Intact   [] Impaired [] Partially Impaired      Language: [] Able to recognize and name objects                         [] Unable to recognize and name [address] [place] [address] of Knowledge:  [] Poor [x]  Fair  [] Good     Speech: [] Normal  [] Soft  [] Slow  [] Fast [] Pressured                                    [] Loud [x] Dysarthria  [] Incoherent        Appearance: [] Well Groomed  [] Casual Dressed  [] Unkept  [x] Disheveled                         [] Normal weight  [] Thin  [x] Overweight  [] Obese           Attitude: [x] Positive  [] Hostile  [] Demanding  [] Guarded  [] Defensive                    [x] Cooperative  []  Uncooperative       Behavior:  [x] Normal Gait  [] Abnormal Gait [] Walks with Assistance  [] Graciela Chair                           [] Walks with Bela Sinning  [] In Hospital Bed  [] Sitting in Chair     Muscle-Skeletal:  [x] Normal Muscle Tone [] Muscle Atrophy                                  [] Abnormal Muscle Movement      Eye Contact:   [] Good eye contact  [] Intermittent Eye Contact  [] Poor Eye Contact               [] Excessive Eye Contact   [] Intrusive Eye Contact     Mood: [x] Depressed  [x] Anxious  [] Irritated  [] Euthymic   [] Angry [] Restless                   [] Apathetic     Affect:  [x] Congruent  [] Incongruent  [] Labile  [] Constricted  [x] Flat  [x] Bizarre                     [] Heightened    [] Exaggerated       Thought Process and Association:  [] Logical [x] Illogical                                        [] Linear and Goal Directed  [] Tangential  [x] Circumstantial      Thought Content:  [] Denies [x] Endorses [x] Suicidal [x] Homicidal  [x] Delusional                                       [x] Paranoid  [] Somatic  [] Grandiose     Perception: []  None  [x] Auditory   [x] Visual  [] tactile   [] olfactory  [] Illusions          Insight: [] Intact  [] Fair  [x] Limited    Judgement:  [] Intact  [] Fair  [x] Limited         Assessment/Plan:        Patient Active Problem List   Diagnosis Code    Iritis of right eye H20.9    Severe major depression with psychotic features ([facility]) F32.3    Bipolar 1 disorder, depressed, severe ([facility] [address] F31.4    Depression with suicidal ideation F32.9, R45.851    Facial droop R29.810    Bell's palsy G51.0    Bipolar 1 disorder ([facility] [address] F31.9    Severe episode of recurrent major depressive disorder, without psychotic features ([facility] [address] F33.2         Plan:    []  Patient is refusing medications  [x] Improving as expected   [] Not improving as expected   [] Worsening    []  At Baseline       Reason for more than one antipsychotic:  [x] N/A  [] 3 failed monotherapy(drugs tried):  [] Cross over to a new antipsychotic  [] Taper to monotherapy from polypharmacy  [] Augmentation of Clozapine therapy due to treatment resistance to single therapy      Signed:  Deidra Schmidt  6/10/2019  8:10 AM

## 2019-06-10 NOTE — GROUP NOTE
Group Therapy Note    Date: Nano 10    Group Start Time: 6538  Group End Time: 1155  Group Topic: Psychoeducation    [facility]  [address] [facility], [facility]        Group Therapy Note    Attendees:14                                                                      Module Name:  Triggers  Patients objective: identify events that produce uncomfortable emotional and or psychiatric symptoms. Status After Intervention:  Improved    Participation Level: Active Listener and Interactive    Participation Quality: Appropriate, Attentive and Sharing      Speech:  normal       Thought Process/Content: Logical      Affective Functioning: Congruent      Mood: euthymic      Level of consciousness:  Alert, Oriented x4 and Attentive      Response to Learning: Able to verbalize/acknowledge new learning, Able to retain information and Progressing to goal      Endings: None Reported    Modes of Intervention: Education, Support, Socialization and Problem-solving      Discipline Responsible: Psychoeducational Specialist      Signature:  AMBER Barraza      Notes: patient quiet and reserved in group, able to share triggers.

## 2019-06-10 NOTE — PLAN OF CARE
Pt. Is positive for SI with no plan, HI towards , A/V Hallucinations, seeing and hearing demons. Pt. Denies physical complaints currently.

## 2019-06-10 NOTE — PLAN OF CARE
Pt admits that he still has suicidal/homicidal ideations without a plan. Up in milieu initiating interactions with staff and peers. Polite, cooperative, social.  Has maintained control of his behavior. Will continue to observe closely.

## 2019-06-10 NOTE — GROUP NOTE
Group Therapy Note    Date: June 9    Group Start Time: 2000  Group End Time: 2030  Group Topic: Wrap-Up, wellness & relaxation    [facility]  [address], RN        Group Therapy Note    Attendees: 19/24

## 2019-06-10 NOTE — CARE COORDINATION
Phone call from pts guardian, adoptive mother, who stated that she was unaware that pt is unable to return upon d/c. Mom is concerned that medication is not working for pt and DOES NOT WANT HIM ON AN INJECTABLE MEDICATION    She will talk to [facility] and pts team and then will discuss with them and will call sw back.

## 2019-06-10 NOTE — GROUP NOTE
Group Therapy Note    Date: Nano 10    Group Start Time: 0130  Group End Time: 0230  Group Topic: Cognitive Skills    [facility] 1    ADOLPH Castellanos [place]    Number of participants: 10  Type of group: Cognitive Skills  Mode of intervention: Education, Support, Socialization, Exploration, Clarifying, Problem-solving and Activity  Topic: Fair Fighting and Conflict Resolution  Objective: Pt will learn how to effectively communicate and resolve conflict with others       Notes:  Pt active and verbal during group. Pre-occupied at times    Status After Intervention:  Improved    Participation Level:  Active Listener    Participation Quality: Appropriate      Speech:  hesitant      Thought Process/Content: Logical      Affective Functioning: Exaggerated      Mood: anxious      Level of consciousness:  Alert and Attentive      Response to Learning: Able to verbalize current knowledge/experience and Able to retain information      Endings: None Reported        Discipline Responsible: /Counselor      Signature:  ADOLPH Castellanos, RASHI

## 2019-06-11 PROCEDURE — 6370000000 HC RX 637 (ALT 250 FOR IP): Performed by: PSYCHIATRY & NEUROLOGY

## 2019-06-11 PROCEDURE — 6370000000 HC RX 637 (ALT 250 FOR IP): Performed by: NURSE PRACTITIONER

## 2019-06-11 PROCEDURE — 99231 SBSQ HOSP IP/OBS SF/LOW 25: CPT | Performed by: NURSE PRACTITIONER

## 2019-06-11 PROCEDURE — 1240000000 HC EMOTIONAL WELLNESS R&B

## 2019-06-11 RX ADMIN — SERTRALINE 25 MG: 50 TABLET, FILM COATED ORAL at 08:33

## 2019-06-11 RX ADMIN — DIVALPROEX SODIUM 500 MG: 500 TABLET, DELAYED RELEASE ORAL at 20:29

## 2019-06-11 RX ADMIN — TRAZODONE HYDROCHLORIDE 50 MG: 50 TABLET ORAL at 20:29

## 2019-06-11 RX ADMIN — OLANZAPINE 20 MG: 10 TABLET, FILM COATED ORAL at 20:29

## 2019-06-11 RX ADMIN — PREDNISOLONE ACETATE 1 DROP: 10 SUSPENSION/ DROPS OPHTHALMIC at 20:30

## 2019-06-11 RX ADMIN — DIVALPROEX SODIUM 500 MG: 500 TABLET, DELAYED RELEASE ORAL at 08:33

## 2019-06-11 RX ADMIN — PREDNISOLONE ACETATE 1 DROP: 10 SUSPENSION/ DROPS OPHTHALMIC at 08:34

## 2019-06-11 RX ADMIN — PREDNISOLONE ACETATE 1 DROP: 10 SUSPENSION/ DROPS OPHTHALMIC at 13:59

## 2019-06-11 RX ADMIN — DIVALPROEX SODIUM 500 MG: 500 TABLET, DELAYED RELEASE ORAL at 13:59

## 2019-06-11 ASSESSMENT — PAIN SCALES - GENERAL: PAINLEVEL_OUTOF10: 0

## 2019-06-11 ASSESSMENT — PAIN - FUNCTIONAL ASSESSMENT: PAIN_FUNCTIONAL_ASSESSMENT: 0-10

## 2019-06-11 NOTE — GROUP NOTE
Group Therapy Note    Date: June 11    Group Start Time: 1100  Group End Time: 5176  Group Topic: Psychoeducation    [facility]  [address], [place]        Group Therapy Note    Attendees: 12       Notes: Active and engaged during discussion, expressing improved assertiveness and self awareness. Able to share one thing to change today. Status After Intervention:  Improved    Participation Level:  Active Listener and Interactive    Participation Quality: Appropriate, Attentive and Sharing      Speech:  normal      Thought Process/Content: Logical      Affective Functioning: Congruent      Mood: euthymic      Level of consciousness:  Alert and Attentive      Response to Learning: Progressing to goal      Endings: None Reported    Modes of Intervention: Education, Support, Socialization and Exploration      Discipline Responsible: Psychoeducational Specialist      Signature:  Simon Givens

## 2019-06-11 NOTE — GROUP NOTE
Group Therapy Note    Date: June 11    Group Start Time: 0215  Group End Time: 0245  Group Topic: Cognitive Skills    [facility] 1    Marissa Winn, CTRS        Group Therapy Note    Number of participants: 5  Type of group: Cognitive Skills  Mode of intervention: Education, Support, Socialization, Exploration, Clarifying and Problem-solving  Topic: Mindfulness: SNACK  Objective: Patient will identify ways to be mindful in recovery utilizing the [facility] acronym. Notes:  Patient was interactive during group sharing ways to be mindful in recovery and how to utilize the [facility] acronym in recovery. Status After Intervention:  Improved    Participation Level:  Active Listener and Interactive    Participation Quality: Appropriate, Attentive, Sharing and Supportive      Speech:  normal      Thought Process/Content: Logical      Affective Functioning: Congruent      Mood: euthymic      Level of consciousness:  Alert, Oriented x4 and Attentive      Response to Learning: Able to verbalize current knowledge/experience, Able to verbalize/acknowledge new learning, Able to retain information, Capable of insight, Able to change behavior and Progressing to goal      Endings: None Reported    Modes of Intervention: Education, Support, Socialization, Exploration, Clarifying and Problem-solving

## 2019-06-11 NOTE — PROGRESS NOTES
[facility]     Group Start: 1935  Group End: 2000

## 2019-06-11 NOTE — PROGRESS NOTES
DATE OF SERVICE:     6/11/2019    Sue Benitez seen today for the purpose of continuation of care. Nursing, social work reports, laboratory studies and vital signs are reviewed. Patient chief complaint today is:             [x] Depression      [x] Anxiety        [x] Psychosis         [x] Suicidal/Homicidal                         [x] Delusions           [] Aggression          Subjective: Today patient states that he talked with his adoptive mom last night. States that he had an argument with her. States that she doesn't want him in the hospital. Feels sad and depressed. States that he feels safe in hospital. Denies AVH at this time. SI/HI improving. Med compliant and goes to groups. No issues on unit. Sleep:  [] Good [x] Fair  [] Poor  Appetite:  [x] Good [] Fair  [] Poor    Depression:  [] Mild [] Moderate [x] Severe                [x] Constant [] Sporadic     Anxiety: [] Mild [] Moderate [x] Severe    [x] Constant [] Sporadic     Delusions: [] Mild [] Moderate [x] Severe     [x] Constant [] Sporadic     [x] Paranoid [] Somatic [] Grandiose     Hallucinations: [] Mild [] Moderate [x] Severe     [] Constant [x] Sporadic    [x] Auditory  [x] Visual [] Tactile       Suicidal: [] Constant [x] Sporadic  Homicidal: [] Constant [x] Sporadic    Unscheduled Medications     [] Patient Receiving Emergency Medications \" Chemical Restraint\"   [] Requesting PRN medications for anxiety    Medical Review of Systems:     All other than marked systmes have been reviewed and are all negative.     Constitutional Symptoms: []  fever []  Chills  Skin Symptoms: [] rash []  Pruritus   Eye Symptoms: [] Vision unchanged []  recent vision problems[] blurred vision   Respiratory Symptoms:[] shortness of breath [] cough  Cardiovascular Symptoms:  [] chest pain   [] palpitations   Gastrointestinal Symptoms: []  abdominal pain []  nausea []  vomiting []  diarrhea  Genitourinary Symptoms: []  dysuria  []  hematuria Musculoskeletal Symptoms: []  back pain []  muscle pain []  joint pain  Neurologic Symptoms: []  headache []  dizziness  Hematolymphoid Symptoms: [] Adenopathy [] Bruises   [] Schimosis       Psychiatric Review of systems  Delusions:  [] Denies [] Endorses   Withdrawals:  [] Denies [] Endorses    Hallucinations: [] Denies [] Endorses    Extra Pyramidal Symptoms: [] Denies [] Endorses      /81   Pulse 81   Temp 97.8 °F (36.6 °C) (Oral)   Resp 18   Ht 5' 8\" (1.727 m)   Wt 248 lb (112.5 kg)   SpO2 99%   BMI 37.71 kg/m²     MENTAL STATUS EXAM:         Cognition:       [x] Alert  [x] Awake  [x] Oriented  [x] Person  [x] Place [x] Time       [] drowsy  [] tired  [] lethargic  [] distractable      Attention/Concentration:   [x] Attentive  [] Distracted         Memory Recent and Remote: [] Intact   [] Impaired [] Partially Impaired      Language: [] Able to recognize and name objects                         [] Unable to recognize and name [address] of Knowledge:  [] Poor [x]  Fair  [] Good     Speech: [] Normal  [] Soft  [] Slow  [] Fast [] Pressured                                    [] Loud [x] Dysarthria  [] Incoherent        Appearance: [] Well Groomed  [] Casual Dressed  [] Unkept  [x] Disheveled                         [] Normal weight  [] Thin  [x] Overweight  [] Obese           Attitude: [x] Positive  [] Hostile  [] Demanding  [] Guarded  [] Defensive                    [x] Cooperative  []  Uncooperative       Behavior:  [x] Normal Gait  [] Abnormal Gait [] Walks with Assistance  [] Graciela Chair                           [] Walks with Sis Rater  [] In Hospital Bed  [] Sitting in Chair     Muscle-Skeletal:  [x] Normal Muscle Tone [] Muscle Atrophy                                  [] Abnormal Muscle Movement      Eye Contact:   [] Good eye contact  [] Intermittent Eye Contact  [] Poor Eye Contact               [] Excessive Eye Contact   [] Intrusive Eye Contact     Mood: [x] Depressed  [x] Anxious  [] Irritated  [] Euthymic   [] Angry [] Restless                   [] Apathetic     Affect:  [x] Congruent  [] Incongruent  [] Labile  [] Constricted  [x] Flat  [x] Bizarre                     [] Heightened    [] Exaggerated       Thought Process and Association:  [] Logical [x] Illogical                                        [] Linear and Goal Directed  [] Tangential  [x] Circumstantial      Thought Content:  [] Denies [x] Endorses [x] Suicidal [x] Homicidal  [x] Delusional                                       [x] Paranoid  [] Somatic  [] Grandiose     Perception: []  None  [x] Auditory   [x] Visual  [] tactile   [] olfactory  [] Illusions          Insight: [] Intact  [] Fair  [x] Limited    Judgement:  [] Intact  [] Fair  [x] Limited         Assessment/Plan:        Patient Active Problem List   Diagnosis Code    Iritis of right eye H20.9    Severe major depression with psychotic features ([facility]) F32.3    Bipolar 1 disorder, depressed, severe ([facility] [address] F31.4    Depression with suicidal ideation F32.9, R45.851    Facial droop R29.810    Bell's palsy G51.0    Bipolar 1 disorder ([address] F31.9    Severe episode of recurrent major depressive disorder, without psychotic features ([facility] [address] F33.2         Plan:    []  Patient is refusing medications  [x] Improving as expected   [] Not improving as expected   [] Worsening    []  At Baseline       Reason for more than one antipsychotic:  [x] N/A  [] 3 failed monotherapy(drugs tried):  [] Cross over to a new antipsychotic  [] Taper to monotherapy from polypharmacy  [] Augmentation of Clozapine therapy due to treatment resistance to single therapy      Signed:  Vero Newsome  6/11/2019  8:13 AM

## 2019-06-11 NOTE — GROUP NOTE
Patient attended community meeting/morning stretch. Patient identified goal for the day as: \"Make it through the day\"                                                                         Group Therapy Note    Date: June 11    Group Start Time: 1000  Group End Time: 8330  Group Topic: Psychoeducation    [facility] 1    Marissa Winn, CTRS        Group Therapy Note    Number of participants: 12  Type of group: Psychoeducation  Mode of intervention: Education, Support, Socialization, Exploration, Clarifying and Problem-solving  Topic: Managing Stress  Objective: Patient will identify ways to manage stress in recovery. Notes:  Patient was interactive during group sharing ways to manage stress in recovery and gave support to others. Status After Intervention:  Improved    Participation Level:  Active Listener and Interactive    Participation Quality: Appropriate, Attentive, Sharing and Supportive      Speech:  normal      Thought Process/Content: Logical      Affective Functioning: Congruent      Mood: euthymic      Level of consciousness:  Alert, Oriented x4 and Attentive      Response to Learning: Able to verbalize current knowledge/experience, Able to verbalize/acknowledge new learning, Able to retain information, Capable of insight, Able to change behavior and Progressing to goal      Endings: None Reported    Modes of Intervention: Education, Support, Socialization, Exploration, Clarifying and Problem-solving

## 2019-06-11 NOTE — PLAN OF CARE
Problem: Anger Management/Homicidal Ideation:  Goal: Able to display appropriate communication and problem solving  Description  Able to display appropriate communication and problem solving  6/10/2019 2032 by Shakeel Ponce RN  Outcome: Ongoing  6/10/2019 1023 by Meek Myers RN  Outcome: Ongoing  Goal: Ability to verbalize frustrations and anger appropriately will improve  Description  Ability to verbalize frustrations and anger appropriately will improve  6/10/2019 2032 by Shakeel [place] RN  Outcome: Ongoing  6/10/2019 1023 by Meek Myers RN  Outcome: Ongoing  Goal: Absence of homicidal ideation  Description  Absence of homicidal ideation  Outcome: Ongoing     Problem: Depressive Behavior With or Without Suicide Precautions:  Goal: Able to verbalize acceptance of life and situations over which he or she has no control  Description  Able to verbalize acceptance of life and situations over which he or she has no control  Outcome: Ongoing  Goal: Able to verbalize and/or display a decrease in depressive symptoms  Description  Able to verbalize and/or display a decrease in depressive symptoms  Outcome: Ongoing  Goal: Ability to disclose and discuss suicidal ideas will improve  Description  Ability to disclose and discuss suicidal ideas will improve  Outcome: Ongoing        Pt states has fleeting SI thoughts with no plan. Pt also positive for homicidal ideations \"at times. \" Pt also positive for AVH. See's demons and hears voices. Pt incongruent. Out on the unit laughing and smiling. Social. Decreased appetite. Denies any medical concerns. Will continue to monitor.

## 2019-06-12 PROCEDURE — 99231 SBSQ HOSP IP/OBS SF/LOW 25: CPT | Performed by: NURSE PRACTITIONER

## 2019-06-12 PROCEDURE — 6370000000 HC RX 637 (ALT 250 FOR IP): Performed by: NURSE PRACTITIONER

## 2019-06-12 PROCEDURE — 1240000000 HC EMOTIONAL WELLNESS R&B

## 2019-06-12 PROCEDURE — 6370000000 HC RX 637 (ALT 250 FOR IP): Performed by: PSYCHIATRY & NEUROLOGY

## 2019-06-12 RX ADMIN — TRAZODONE HYDROCHLORIDE 50 MG: 50 TABLET ORAL at 04:07

## 2019-06-12 RX ADMIN — ACETAMINOPHEN 650 MG: 325 TABLET, FILM COATED ORAL at 12:56

## 2019-06-12 RX ADMIN — CLONAZEPAM 0.5 MG: 0.5 TABLET ORAL at 12:56

## 2019-06-12 RX ADMIN — PREDNISOLONE ACETATE 1 DROP: 10 SUSPENSION/ DROPS OPHTHALMIC at 20:20

## 2019-06-12 RX ADMIN — DIVALPROEX SODIUM 500 MG: 500 TABLET, DELAYED RELEASE ORAL at 20:20

## 2019-06-12 RX ADMIN — SERTRALINE 25 MG: 50 TABLET, FILM COATED ORAL at 08:16

## 2019-06-12 RX ADMIN — OLANZAPINE 20 MG: 10 TABLET, FILM COATED ORAL at 20:20

## 2019-06-12 RX ADMIN — PREDNISOLONE ACETATE 1 DROP: 10 SUSPENSION/ DROPS OPHTHALMIC at 08:16

## 2019-06-12 RX ADMIN — TRAZODONE HYDROCHLORIDE 50 MG: 50 TABLET ORAL at 20:20

## 2019-06-12 RX ADMIN — PREDNISOLONE ACETATE 1 DROP: 10 SUSPENSION/ DROPS OPHTHALMIC at 13:25

## 2019-06-12 RX ADMIN — DIVALPROEX SODIUM 500 MG: 500 TABLET, DELAYED RELEASE ORAL at 13:25

## 2019-06-12 RX ADMIN — ACETAMINOPHEN 650 MG: 325 TABLET, FILM COATED ORAL at 19:45

## 2019-06-12 RX ADMIN — DIVALPROEX SODIUM 500 MG: 500 TABLET, DELAYED RELEASE ORAL at 08:16

## 2019-06-12 ASSESSMENT — PAIN SCALES - GENERAL
PAINLEVEL_OUTOF10: 9
PAINLEVEL_OUTOF10: 7
PAINLEVEL_OUTOF10: 0

## 2019-06-12 NOTE — PLAN OF CARE
Patient denies SI, HI and hallucinations at this time. Patient is calm and cooperative. Patient appears brightened this shift. Patient is out on unit and is social with peers. Patient attended evening groups. Medications taken without issue. No complaints or concerns voiced at this time. No unit problems reported. Will continue to observe and support.      Problem: Depressive Behavior With or Without Suicide Precautions:  Goal: Ability to disclose and discuss suicidal ideas will improve  Description  Ability to disclose and discuss suicidal ideas will improve  Outcome: Met This Shift  Note:   Pt denies SI     Problem: Depressive Behavior With or Without Suicide Precautions:  Goal: Able to verbalize and/or display a decrease in depressive symptoms  Description  Able to verbalize and/or display a decrease in depressive symptoms  Outcome: Ongoing  Note:   Pt appears brightened this shift

## 2019-06-12 NOTE — PROGRESS NOTES
[] Adenopathy [] Bruises   [] Schimosis       Psychiatric Review of systems  Delusions:  [] Denies [] Endorses   Withdrawals:  [] Denies [] Endorses    Hallucinations: [] Denies [] Endorses    Extra Pyramidal Symptoms: [] Denies [] Endorses      BP 94/64   Pulse 67   Temp 98.1 °F (36.7 °C) (Oral)   Resp 19   Ht 5' 8\" (1.727 m)   Wt 248 lb (112.5 kg)   SpO2 99%   BMI 37.71 kg/m²     MENTAL STATUS EXAM:         Cognition:       [x] Alert  [x] Awake  [x] Oriented  [x] Person  [x] Place [x] Time       [] drowsy  [] tired  [] lethargic  [] distractable      Attention/Concentration:   [x] Attentive  [] Distracted         Memory Recent and Remote: [] Intact   [] Impaired [] Partially Impaired      Language: [] Able to recognize and name objects                         [] Unable to recognize and name [address] [place] [address] of Knowledge:  [] Poor [x]  Fair  [] Good     Speech: [] Normal  [] Soft  [] Slow  [] Fast [] Pressured                                    [] Loud [x] Dysarthria  [] Incoherent        Appearance: [] Well Groomed  [] Casual Dressed  [] Unkept  [x] Disheveled                         [] Normal weight  [] Thin  [x] Overweight  [] Obese           Attitude: [x] Positive  [] Hostile  [] Demanding  [] Guarded  [] Defensive                    [x] Cooperative  []  Uncooperative       Behavior:  [x] Normal Gait  [] Abnormal Gait [] Walks with Assistance  [] Graciela Chair                           [] Walks with Jess Minium  [] In Hospital Bed  [] Sitting in Chair     Muscle-Skeletal:  [x] Normal Muscle Tone [] Muscle Atrophy                                  [] Abnormal Muscle Movement      Eye Contact:   [] Good eye contact  [] Intermittent Eye Contact  [] Poor Eye Contact               [] Excessive Eye Contact   [] Intrusive Eye Contact     Mood: [x] Depressed  [x] Anxious  [] Irritated  [] Euthymic   [] Angry [] Restless                   [] Apathetic     Affect:  [x] Congruent  [] Incongruent  [] Labile  [] Constricted [x] Flat  [x] Bizarre                     [] Heightened    [] Exaggerated       Thought Process and Association:  [] Logical [x] Illogical                                        [] Linear and Goal Directed  [] Tangential  [x] Circumstantial      Thought Content:  [x] Denies [] Endorses [] Suicidal [] Homicidal  [x] Delusional                                       [] Paranoid  [] Somatic  [] Grandiose     Perception: [x]  None  [] Auditory   [] Visual  [] tactile   [] olfactory  [] Illusions          Insight: [] Intact  [] Fair  [x] Limited    Judgement:  [] Intact  [] Fair  [x] Limited         Assessment/Plan:        Patient Active Problem List   Diagnosis Code    Iritis of right eye H20.9    Severe major depression with psychotic features ([facility]) F32.3    Bipolar 1 disorder, depressed, severe ([facility] [address] F31.4    Depression with suicidal ideation F32.9, R45.851    Facial droop R29.810    Bell's palsy G51.0    Bipolar 1 disorder ([facility] [address] F31.9    Severe episode of recurrent major depressive disorder, without psychotic features ([facility] [address] F33.2         Plan:    []  Patient is refusing medications  [x] Improving as expected   [] Not improving as expected   [] Worsening    []  At Baseline     Continue current treatment      Reason for more than one antipsychotic:  [x] N/A  [] 3 failed monotherapy(drugs tried):  [] Cross over to a new antipsychotic  [] Taper to monotherapy from polypharmacy  [] Augmentation of Clozapine therapy due to treatment resistance to single therapy      Signed:  Pantera Rivas  6/12/2019  8:04 AM

## 2019-06-12 NOTE — CARE COORDINATION
Phone call from evangelina who stated pt will be accepted at [facility] for residential treatment Friday 6/14 and asked that sw set up transport for pt. Phone call to [facility], [facility], [facility], [facility] and [facility] cannot [facility]. [facility] 1pm Friday. Scheduled and called evangelina to advise her. She will be here to talk to pt about transfer tomorrow.

## 2019-06-12 NOTE — CARE COORDINATION
Phone call from pts SSA who stated that she has a meeting with pts guardian this morning and will discuss placement with guardian and then guardian will talk to pt regarding this. Horacio Steen will call sw back with plan.

## 2019-06-12 NOTE — GROUP NOTE
Group Therapy Note    Date: June 12    Group Start Time: 0145  Group End Time: 0225  Group Topic: Cognitive Skills    [facility]    [facility]    Number of participants: 10  Type of group: Cognitive Skills  Mode of intervention: Education, Support, Socialization, Exploration, Clarifying, Problem-solving and Activity  Topic: Building sustained happiness  Objective: Pt to identify ways to increase sustainable happiness     Notes:  Pt was active and verbal during group and was able to identify ways to increase happiness    Status After Intervention:  Improved    Participation Level:  Active Listener    Participation Quality: Appropriate, Attentive, Sharing and Supportive      Speech:  normal      Thought Process/Content: Logical      Affective Functioning: Congruent      Mood: anxious      Level of consciousness:  Alert and Attentive      Response to Learning: Able to verbalize current knowledge/experience and Able to retain information      Endings: None Reported    Discipline Responsible: /Counselor      Signature:  ADOLPH Mccormick, [facility]

## 2019-06-12 NOTE — GROUP NOTE
Group Therapy Note    Date: June 11    Group Start Time: 2030  Group End Time: 2130  Group Topic: Healthy Living/Wellness    [facility]    Billie Aguilar, RN; Nino Myers RN        Group Therapy Note    Pt attended and participated in reflection group and wrap up group.

## 2019-06-13 PROCEDURE — 6370000000 HC RX 637 (ALT 250 FOR IP): Performed by: PSYCHIATRY & NEUROLOGY

## 2019-06-13 PROCEDURE — 6370000000 HC RX 637 (ALT 250 FOR IP): Performed by: NURSE PRACTITIONER

## 2019-06-13 PROCEDURE — 99231 SBSQ HOSP IP/OBS SF/LOW 25: CPT | Performed by: NURSE PRACTITIONER

## 2019-06-13 PROCEDURE — 1240000000 HC EMOTIONAL WELLNESS R&B

## 2019-06-13 RX ORDER — SERTRALINE HYDROCHLORIDE 25 MG/1
25 TABLET, FILM COATED ORAL DAILY
Qty: 30 TABLET | Refills: 0 | DISCHARGE
Start: 2019-06-14

## 2019-06-13 RX ADMIN — PREDNISOLONE ACETATE 1 DROP: 10 SUSPENSION/ DROPS OPHTHALMIC at 09:26

## 2019-06-13 RX ADMIN — DIVALPROEX SODIUM 500 MG: 500 TABLET, DELAYED RELEASE ORAL at 08:16

## 2019-06-13 RX ADMIN — TRAZODONE HYDROCHLORIDE 50 MG: 50 TABLET ORAL at 20:18

## 2019-06-13 RX ADMIN — PREDNISOLONE ACETATE 1 DROP: 10 SUSPENSION/ DROPS OPHTHALMIC at 14:03

## 2019-06-13 RX ADMIN — DIVALPROEX SODIUM 500 MG: 500 TABLET, DELAYED RELEASE ORAL at 14:02

## 2019-06-13 RX ADMIN — CLONAZEPAM 0.5 MG: 0.5 TABLET ORAL at 14:02

## 2019-06-13 RX ADMIN — OLANZAPINE 20 MG: 10 TABLET, FILM COATED ORAL at 20:18

## 2019-06-13 RX ADMIN — PREDNISOLONE ACETATE 1 DROP: 10 SUSPENSION/ DROPS OPHTHALMIC at 20:17

## 2019-06-13 RX ADMIN — NICOTINE POLACRILEX 2 MG: 2 GUM, CHEWING BUCCAL at 08:17

## 2019-06-13 RX ADMIN — DIVALPROEX SODIUM 500 MG: 500 TABLET, DELAYED RELEASE ORAL at 20:17

## 2019-06-13 RX ADMIN — ACETAMINOPHEN 650 MG: 325 TABLET, FILM COATED ORAL at 08:16

## 2019-06-13 RX ADMIN — SERTRALINE 25 MG: 50 TABLET, FILM COATED ORAL at 08:16

## 2019-06-13 ASSESSMENT — PAIN DESCRIPTION - DESCRIPTORS: DESCRIPTORS: ACHING;CRAMPING;DISCOMFORT

## 2019-06-13 ASSESSMENT — PAIN SCALES - GENERAL
PAINLEVEL_OUTOF10: 0
PAINLEVEL_OUTOF10: 4

## 2019-06-13 ASSESSMENT — PAIN DESCRIPTION - LOCATION: LOCATION: GENERALIZED

## 2019-06-13 ASSESSMENT — PAIN DESCRIPTION - PAIN TYPE: TYPE: ACUTE PAIN

## 2019-06-13 ASSESSMENT — PAIN DESCRIPTION - ONSET: ONSET: GRADUAL

## 2019-06-13 ASSESSMENT — PAIN DESCRIPTION - FREQUENCY: FREQUENCY: CONTINUOUS

## 2019-06-13 NOTE — GROUP NOTE
Group Therapy Note    Date: June 13    Group Start Time: 1100  Group End Time: 1304  Group Topic: Psychoeducation    [facility]  [address] [place], [place]        Group Therapy Note    Attendees: 14       Notes: Attended and participated in discussion on self esteem. Able to share and reflect with peers. Status After Intervention:  Improved    Participation Level:  Active Listener and Interactive    Participation Quality: Appropriate and Attentive      Speech:  normal      Thought Process/Content: Logical      Affective Functioning: Congruent      Mood: euthymic      Level of consciousness:  Alert and Attentive      Response to Learning: Able to change behavior and Progressing to goal      Endings: None Reported    Modes of Intervention: Education, Support, Socialization and Exploration      Discipline Responsible: Psychoeducational Specialist      Signature:  Marii Gleason [place]

## 2019-06-13 NOTE — PLAN OF CARE
Out and about on the unit. Denies suicidal ideations or intent to harm himself or others. No voiced delusions. Denies hallucinations. Attending unit activities. Reports eating and sleeping well.

## 2019-06-13 NOTE — PROGRESS NOTES
DATE OF SERVICE:     6/13/2019    Bryant Montgomery seen today for the purpose of continuation of care. Nursing, social work reports, laboratory studies and vital signs are reviewed. Patient chief complaint today is:             [] Depression      [x] Anxiety        [] Psychosis         [] Suicidal/Homicidal                         [] Delusions           [] Aggression          Subjective: Today patient states that he had a good visit with mom and dad last evening. Denies SI, HI, and AVH. Denies depression. Med compliant and goes to groups. No issues on unit. Accepting of discharge plan. Sleep:  [x] Good [] Fair  [] Poor  Appetite:  [x] Good [] Fair  [] Poor    Depression:  [x] Mild [] Moderate [] Severe                [x] Constant [] Sporadic     Anxiety: [] Mild [x] Moderate [] Severe    [x] Constant [] Sporadic     Delusions: [] Mild [] Moderate [] Severe     [] Constant [] Sporadic     [] Paranoid [] Somatic [] Grandiose     Hallucinations: [] Mild [] Moderate [] Severe     [] Constant [] Sporadic    [] Auditory  [] Visual [] Tactile       Suicidal: [] Constant [] Sporadic  Homicidal: [] Constant [] Sporadic    Unscheduled Medications     [] Patient Receiving Emergency Medications \" Chemical Restraint\"   [] Requesting PRN medications for anxiety    Medical Review of Systems:     All other than marked systmes have been reviewed and are all negative.     Constitutional Symptoms: []  fever []  Chills  Skin Symptoms: [] rash []  Pruritus   Eye Symptoms: [] Vision unchanged []  recent vision problems[] blurred vision   Respiratory Symptoms:[] shortness of breath [] cough  Cardiovascular Symptoms:  [] chest pain   [] palpitations   Gastrointestinal Symptoms: []  abdominal pain []  nausea []  vomiting []  diarrhea  Genitourinary Symptoms: []  dysuria  []  hematuria   Musculoskeletal Symptoms: []  back pain []  muscle pain []  joint pain  Neurologic Symptoms: []  headache []  dizziness  Hematolymphoid

## 2019-06-13 NOTE — PLAN OF CARE
Patient sad but aware of transfer to [address] tomorrow. He spoke with case manger over the phone. He verbalizes he is Okay with this. Denies SI/HI and hallucinations.

## 2019-06-13 NOTE — GROUP NOTE
Group Therapy Note    Date: June 12    Group Start Time: 1900  Group End Time: 2000  Group Topic: Healthy Living/Wellness    [facility]  1    Amada Contreras RN        Group Therapy Note    Attendees: Patient attended and participated in Wellness Group.           Signature:  Amada Contreras RN

## 2019-06-13 NOTE — GROUP NOTE
Group Therapy Note    Date: June 12    Group Start Time: 2000  Group End Time: 2030  Group Topic: Wrap-Up    [facility] 1    Korey Locke RN        Group Therapy Note    Attendees:  Patient attended [address] [place] [address] group.         Signature:  Korey Locke RN

## 2019-06-13 NOTE — PLAN OF CARE
Patient denies SI, HI and hallucinations at this time. Patient is calm and cooperative. Patient presents flat and sad. Patient is out on unit and did attend evening groups. No complaints or concerns voiced at this time. No unit problems reported. Will continue to observe and support.      Problem: Anger Management/Homicidal Ideation:  Goal: Absence of homicidal ideation  Description  Absence of homicidal ideation  Outcome: Met This Shift  Note:   Pt denies HI     Problem: Depressive Behavior With or Without Suicide Precautions:  Goal: Ability to disclose and discuss suicidal ideas will improve  Description  Ability to disclose and discuss suicidal ideas will improve  6/12/2019 2026 by Steffany Weston RN  Outcome: Met This Shift  Note:   Pt denies SI

## 2019-06-14 VITALS
BODY MASS INDEX: 37.59 KG/M2 | TEMPERATURE: 98.2 F | SYSTOLIC BLOOD PRESSURE: 118 MMHG | RESPIRATION RATE: 12 BRPM | WEIGHT: 248 LBS | HEIGHT: 68 IN | DIASTOLIC BLOOD PRESSURE: 74 MMHG | HEART RATE: 78 BPM | OXYGEN SATURATION: 99 %

## 2019-06-14 PROCEDURE — 6370000000 HC RX 637 (ALT 250 FOR IP): Performed by: NURSE PRACTITIONER

## 2019-06-14 PROCEDURE — 6370000000 HC RX 637 (ALT 250 FOR IP): Performed by: PSYCHIATRY & NEUROLOGY

## 2019-06-14 PROCEDURE — 99238 HOSP IP/OBS DSCHRG MGMT 30/<: CPT | Performed by: NURSE PRACTITIONER

## 2019-06-14 RX ADMIN — DIVALPROEX SODIUM 500 MG: 500 TABLET, DELAYED RELEASE ORAL at 09:37

## 2019-06-14 RX ADMIN — SERTRALINE 25 MG: 50 TABLET, FILM COATED ORAL at 09:37

## 2019-06-14 RX ADMIN — NICOTINE POLACRILEX 2 MG: 2 GUM, CHEWING BUCCAL at 12:04

## 2019-06-14 RX ADMIN — PREDNISOLONE ACETATE 1 DROP: 10 SUSPENSION/ DROPS OPHTHALMIC at 09:38

## 2019-06-14 ASSESSMENT — PAIN SCALES - GENERAL
PAINLEVEL_OUTOF10: 0
PAINLEVEL_OUTOF10: 0

## 2019-06-14 NOTE — PROGRESS NOTES
[facility]  Discharge Note    Pt discharged with followings belongings:   Dentures: None  Vision - Corrective Lenses: None  Hearing Aid: None  Jewelry: Bracelet(Bracelets x10)  Body Piercings Removed: N/A  Clothing: Shirt, Pants, Socks, Undergarments (Comment), Other (Comment)(Several pants and shirts)  Were All Patient Medications Collected?: Not Applicable  Other Valuables: Other (Comment)(battery pack and shabbir phone )   Valuables sent home with yes. Valuables retrieved from safe, Security envelope number:  n/a and returned to patient. Patient left department with Departure Mode: In ambulance via Mobility at [address] [place] [address], discharged to Discharged to: Other (Comment)([facility]). Patient education on aftercare instructions: yes  Information faxed to [facility]  by [facility]ero Patient verbalize understanding of AVS:  yes.     Status EXAM upon discharge:  Status and Exam  Normal: Yes  Facial Expression: Flat, Sad  Affect: Congruent  Level of Consciousness: Alert  Mood:Normal: No  Mood: Anxious  Motor Activity:Normal: Yes  Motor Activity: Decreased  Interview Behavior: Cooperative  Preception: Ar[place] to Person, Austyn Nicks to Time, Arnot to Place, Arnot to Situation  Attention:Normal: No  Attention: Distractible  Thought Processes: Circumstantial  Thought Content:Normal: Yes  Thought Content: Preoccupations  Hallucinations: None  Delusions: No  Delusions: Persecution  Memory:Normal: No  Memory: Poor Recent  Insight and Judgment: No  Insight and Judgment: Poor Judgment, Poor Insight  Present Suicidal Ideation: No  Present Homicidal Ideation: No      Metabolic Screening:    Lab Results   Component Value Date    LABA1C 5.5 11/16/2018       Lab Results   Component Value Date    CHOL 144 11/16/2018    CHOL 169 11/28/2017     Lab Results   Component Value Date    TRIG 141 11/16/2018    TRIG 128 11/28/2017     Lab Results   Component Value Date    HDL 41 11/16/2018    HDL 36 11/28/2017 No components found for: [facility]  Lab Results   Component Value Date    LABVLDL 28 11/16/2018    LABVLDL 26 11/28/2017       Aline Hilliard RN

## 2019-06-14 NOTE — PROGRESS NOTES
Spoke with [facility], d/c instructions faxed, will fill medications at facility. Spoke with director at [facility], [address] [place] [address] given home medications and will take to [facility] [employer]. Nurse will call back for nurse to nurse.

## 2019-06-14 NOTE — GROUP NOTE
Group Therapy Note    Date: June 14    Group Start Time: 1110  Group End Time: 7052  Group Topic: Psychotherapy    [facility]  [address], [place]    Number of participants: 13  Type of group: Psychotherapy  Mode of intervention: Support, Socialization and Exploration  Topic: [place] based identification of how to improve relationships with others  Objective: Pt to identify with other members and identify ways to improve interaction with others     Notes:  pt was active and verbal during group and was able to identify with others     Status After Intervention:  Improved     Participation Level:  Active Listener and Interactive     Participation Quality: Appropriate, Attentive, Sharing and Supportive        Speech:  normal        Thought Process/Content: Logical        Affective Functioning: Congruent        Mood: euthymic        Level of consciousness:  Alert, Oriented x4 and Attentive        Response to Learning: Able to verbalize current knowledge/experience and Able to retain information        Endings: None Reported     Modes of Intervention: Support, Socialization and Exploration        Discipline Responsible: /Counselor        Signature:  ADOLPH Dodson, RASHI

## 2019-06-14 NOTE — DISCHARGE SUMMARY
Devon Klinefelter  [] In Hospital Bed  [] Sitting in Chair    Muscle-Skeletal:  [x] Normal Muscle Tone [] Muscle Atrophy       [] Abnormal Muscle Movement     Eye Contact:  [] Good eye contact  [] Intermittent Eye Contact  [x] Poor Eye Contact     Mood: [x] Depressed  [] Anxious  [] Irritated  [] Euthymic   [] Angry [] Restless    Affect:  [x] Congruent  [] Incongruent  [] Labile  [] Constricted  [x] Flat  [] Bizarre     Thought Process and Association:  [] Logical [] Illogical       [x] Linear and Goal Directed  [] Tangential  [] Circumstantial     Thought Content:  [x] Denies [] Endorses [] Suicidal [] Homicidal  [] Delusional      [] Paranoid  [] Somatic  [] Grandiose    Perception: [x]  None  [] Auditory   [] Visual  [] tactile   [] olfactory  [] Illusions         Insight: [] Intact  [] Fair  [x] Limited    Judgement:  [] Intact  [] Fair  [x] Limited    Hospital Course:   Admit Date: 6/7/2019     Discharge Date: 6/14/2019  Admitted from:  [x]  Emergency Room  []  Home  []  Another facility   []  [facility]     Admitting diagnosis:   Patient Active Problem List   Diagnosis    Iritis of right eye    Severe major depression with psychotic features ([address]    Bipolar 1 disorder, depressed, severe ([address]    Depression with suicidal ideation    Facial droop    Bell's palsy    Bipolar 1 disorder ([address]    Severe episode of recurrent major depressive disorder, without psychotic features ([address]      Length of stay:  6 days              Andres Nelson was admitted in Psychiatric unit  from ED with depression. Patient was treated with the above. Patient responded well to the treatment.      Discharge Summary Plan:     Discharge Status:    [] Improved [x] Unchanged    [] Worse       Discharge instructions given:  [x] Patient    [x] Family [] Other         Discharge disposition:  [] Home [] Step Down unit  [] Group Home []  [facility]                                                    [] [facility]    [] AMA  [x] Other [facility]          Prescriptions: Continue same medications, review with patient.        Reason for more than one antipsychotic:  [x] N/A  [] 3 failed monotherapy(drugs tried):  [] Cross over to a new antipsychotic  [] Taper to monotherapy from polypharmacy  [] Augmentation of Clozapine therapy due to treatment resistance to single therapy      Diagnosis:        Patient Active Problem List   Diagnosis Code    Iritis of right eye H20.9    Severe major depression with psychotic features ([facility] F32.3    Bipolar 1 disorder, depressed, severe ([address] F31.4    Depression with suicidal ideation F32.9, R45.851    Facial droop R29.810    Bell's palsy G51.0    Bipolar 1 disorder ([address] F31.9    Severe episode of recurrent major depressive disorder, without psychotic features ([address] F33.2       Education and Follow-up:  Counseled:  [] Patient     [] Family    [] Guardian      Signed:   Vero Newsome   6/14/2019   8:37 AM

## 2019-06-14 NOTE — GROUP NOTE
Group Therapy Note    Date: June 13    Group Start Time: 1950  Group End Time: 2030  Group Topic: Healthy Living/Wellness    [facility]    Liz Quintanilla, RN        Group Therapy Note    Pt attended and participated in healthy wellness group and wrap up group.

## 2019-06-14 NOTE — GROUP NOTE
Group Therapy Note    Date: June 14    Group Start Time: 1005  Group End Time: 1045  Group Topic: Psychoeducation    [facility]    Marissa Winn, CTRS        Group Therapy Note    Number of participants: 16  Type of group: Psychoeducation  Mode of intervention: Education, Support, Socialization, Exploration, Clarifying, Problem-solving and Activity  Topic: Meditation   Objective: Patient will identify benefits of meditation in recovery. Notes:  Patient was interactive during group sharing benefits of meditation and participated in meditation activity. Status After Intervention:  Improved    Participation Level:  Active Listener and Interactive    Participation Quality: Appropriate, Attentive, Sharing and Supportive      Speech:  normal      Thought Process/Content: Logical      Affective Functioning: Congruent      Mood: euthymic      Level of consciousness:  Alert, Oriented x4 and Attentive      Response to Learning: Able to verbalize current knowledge/experience, Able to verbalize/acknowledge new learning, Able to retain information, Capable of insight, Able to change behavior and Progressing to goal      Endings: None Reported    Modes of Intervention: Education, Support, Socialization, Exploration, Clarifying, Problem-solving and Activity

## 2019-09-13 ENCOUNTER — TELEPHONE (OUTPATIENT)
Dept: ADMINISTRATIVE | Age: 24
End: 2019-09-13

## 2020-02-06 ENCOUNTER — HOSPITAL ENCOUNTER (OUTPATIENT)
Age: 25
Discharge: HOME | End: 2020-02-06
Payer: MEDICAID

## 2020-02-06 LAB
ALBUMIN SERPL-MCNC: 4.5 G/DL (ref 3.5–5.2)
ALP BLD-CCNC: 71 U/L (ref 40–129)
ALT SERPL-CCNC: 15 U/L (ref 0–40)
AMMONIA: 57.6 UMOL/L (ref 16–60)
ANION GAP SERPL CALCULATED.3IONS-SCNC: 11 MMOL/L (ref 7–16)
AST SERPL-CCNC: 15 U/L (ref 0–39)
BILIRUB SERPL-MCNC: 0.2 MG/DL (ref 0–1.2)
BUN BLDV-MCNC: 11 MG/DL (ref 6–20)
CALCIUM SERPL-MCNC: 9 MG/DL (ref 8.6–10.2)
CHLORIDE BLD-SCNC: 103 MMOL/L (ref 98–107)
CHOLESTEROL, TOTAL: 146 MG/DL (ref 0–199)
CO2: 26 MMOL/L (ref 22–29)
CREAT SERPL-MCNC: 0.9 MG/DL (ref 0.7–1.2)
GFR AFRICAN AMERICAN: >60
GFR NON-AFRICAN AMERICAN: >60 ML/MIN/1.73
GLUCOSE BLD-MCNC: 100 MG/DL (ref 74–99)
HBA1C MFR BLD: 5.1 % (ref 4–5.6)
HCT VFR BLD CALC: 41 % (ref 37–54)
HDLC SERPL-MCNC: 43 MG/DL
HEMOGLOBIN: 13.9 G/DL (ref 12.5–16.5)
LDL CHOLESTEROL CALCULATED: 79 MG/DL (ref 0–99)
MCH RBC QN AUTO: 31.7 PG (ref 26–35)
MCHC RBC AUTO-ENTMCNC: 33.9 % (ref 32–34.5)
MCV RBC AUTO: 93.6 FL (ref 80–99.9)
PDW BLD-RTO: 11.7 FL (ref 11.5–15)
PLATELET # BLD: 237 E9/L (ref 130–450)
PMV BLD AUTO: 9.2 FL (ref 7–12)
POTASSIUM SERPL-SCNC: 4 MMOL/L (ref 3.5–5)
RBC # BLD: 4.38 E12/L (ref 3.8–5.8)
SODIUM BLD-SCNC: 140 MMOL/L (ref 132–146)
TOTAL PROTEIN: 7.4 G/DL (ref 6.4–8.3)
TRIGL SERPL-MCNC: 122 MG/DL (ref 0–149)
TSH SERPL DL<=0.05 MIU/L-ACNC: 2.46 UIU/ML (ref 0.27–4.2)
VALPROIC ACID LEVEL: 58 MCG/ML (ref 50–100)
VLDLC SERPL CALC-MCNC: 24 MG/DL
WBC # BLD: 5.1 E9/L (ref 4.5–11.5)

## 2020-02-06 PROCEDURE — 80164 ASSAY DIPROPYLACETIC ACD TOT: CPT

## 2020-02-06 PROCEDURE — 85027 COMPLETE CBC AUTOMATED: CPT

## 2020-02-06 PROCEDURE — 80053 COMPREHEN METABOLIC PANEL: CPT

## 2020-02-06 PROCEDURE — 80061 LIPID PANEL: CPT

## 2020-02-06 PROCEDURE — 82140 ASSAY OF AMMONIA: CPT

## 2020-02-06 PROCEDURE — 83036 HEMOGLOBIN GLYCOSYLATED A1C: CPT

## 2020-02-06 PROCEDURE — 84443 ASSAY THYROID STIM HORMONE: CPT

## 2020-02-06 PROCEDURE — 36415 COLL VENOUS BLD VENIPUNCTURE: CPT

## 2024-04-17 NOTE — H&P
PSYCHIATRIC EVALUATION  (HISTORY & PHYSICAL)     CHIEF COMPLAINT:   [x] Mood Problems [x] Anxiety Problems [] Psychosis                    [x] Suicidal/Homicidal   [] Aggression  [] Other    HISTORY OF PRESENT ILLNESS: Katie Rosa  is a 21 y.o. male who has a previous psychiatric history of depression, anxiety, Fetal Alcohol Syndrome, mild developmentally delayed, SA, and Tourette's Syndrome presents for admission with HI toward director of group home with plan to stab him and SI with plan to stab self. Symptoms onset was years ago and is becoming severe for the last few weeks. This presentation associates with command , VH, HI, SI, depression, anxiety, . Symptoms are sporadic and usually is worsened by nothing. Precipitating factor:  Patient feels that he is being mistreated at the group home and expressed HI toward them to his PCP. Patient states that he is very paranoid at group home. States that he took [place] injection 1 time as outpatient at end of February, but that his mood was \"bad\" when he went to [place], and his adoptive mom and guardian decided that he should not be on that medication.     Precipitating Factors:     [] Family Stress   [] Recent loss/grief Stress   [] Health Stress   [] Relationship Stress    [] Legal Stress   [x] Environmental Stress    [] Occupational Stress   [] Financial Stress   [] Substance Abuse [] Other      PAST PSYCHIATRIC HISTORY:     History of psychiatric Hospitalization:    [] Denies    [x] yes  [] Days ago     []  Weeks Ago    [x] Months ago  [] Years ago              [x] [facility]  [] [facility]  [] Other:        [] Once  [x] More than once    Outpatient treatment:  [] [facility]  [] [facility]  [] [facility]              [] [facility]  [] [facility] Rued[facility]      [] [facility] [] [facility]      [] [facility] [] [facility]  [] [facility] [] [facility]  [] Other               [] currently  [] in the past  [] Non-Compliant    [] Denies    Previous suicide attempt: []Denies Environment         [x] Supportive Family []         [] [place] Support     3. Level of Risk: [] Mild [] Moderate [x] Severe      Strengths & Weaknesses:    1. Strengths: [x] Ability to communicate feelings     [x] Independent ADL's     [x] Supportive Family    [] Current Health Status     2. Weaknesses: [x] Emotional          [x] Motivational     MEDICATIONS: Current Facility-Administered Medications: divalproex (DEPAKOTE) DR tablet 500 mg, 500 mg, Oral, TID  clonazePAM (KLONOPIN) tablet 0.5 mg, 0.5 mg, Oral, TID PRN  OLANZapine (ZYPREXA) tablet 20 mg, 20 mg, Oral, Nightly  prednisoLONE acetate (PRED FORTE) 1 % ophthalmic suspension 1 drop, 1 drop, Right Eye, TID  acetaminophen (TYLENOL) tablet 650 mg, 650 mg, Oral, Q4H PRN  hydrOXYzine (VISTARIL) capsule 50 mg, 50 mg, Oral, Q6H PRN  haloperidol lactate (HALDOL) injection 10 mg, 10 mg, Intramuscular, Q6H PRN  traZODone (DESYREL) tablet 50 mg, 50 mg, Oral, Nightly PRN  benztropine mesylate (COGENTIN) injection 2 mg, 2 mg, Intramuscular, BID PRN  magnesium hydroxide (MILK OF MAGNESIA) 400 MG/5ML suspension 30 mL, 30 mL, Oral, Daily PRN  aluminum & magnesium hydroxide-simethicone (MAALOX) 200-200-20 MG/5ML suspension 30 mL, 30 mL, Oral, PRN  nicotine polacrilex (NICORETTE) gum 2 mg, 2 mg, Oral, Q2H PRN    Medical Review of Systems:     All other than marked systmes have been reviewed and are all negative.     Constitutional Symptoms: []  fever []  Chills  Skin Symptoms: [] rash []  Pruritus   Eye Symptoms: [] Vision unchanged []  recent vision problems[] blurred vision   Respiratory Symptoms:[] shortness of breath [] cough  Cardiovascular Symptoms:  [] chest pain   [] palpitations   Gastrointestinal Symptoms: []  abdominal pain []  nausea []  vomiting []  diarrhea  Genitourinary Symptoms: []  dysuria  []  hematuria   Musculoskeletal Symptoms: []  back pain []  muscle pain []  joint pain  Neurologic Symptoms: []  headache []  dizziness  Hematolymphoid Symptoms: [] Adenopathy [] Bruises   [] Schimosis       VITALS: BP (!) 136/90   Pulse 73   Temp 98 °F (36.7 °C) (Oral)   Resp 16   Ht 5' 8\" (1.727 m)   Wt 248 lb (112.5 kg)   SpO2 99%   BMI 37.71 kg/m²     ALLERGIES: Patient has no known allergies.             Physical Examination:    Head:  [x] Atraumatic:  [x] normocephalic  Skin and Mucosa       [] Moist [] Dry [] Pale [x] Normal   Neck: [x] Thyroid [] Palpable    [x] Not palpable []  venus distention [] adenopathy   Chest: [x] Clear [] Rhonchi  [] Wheezing   CV: [x] S1 [x] S2 [x] No murmer   Abdomen:  [x] Soft   [] Tender  [] Viceromegaly   Extremities:  [x] No Edema   [] Edema    Cranial Nerves Examination:    CN II: [x] Pupils are reactive to light [] Pupils are non reactive to light  CN III, IV, VI:[x] No eye deviation  [x] No diplopia or ptosis   CN V: [x] Facial Sensation is intact  [] Facial Sensation is not intact   CN IIIV:  [x] Hearing is normal to rubbing fingers   CN IX, X:  [x] Normal gag reflex and phonation   CN XI: [x] Shoulder shrug and neck rotation is normal  CNXII: [x] Tongue is midline no deviation or atrophy       For further PE refer to ED note      MENTAL STATUS EXAM:       Cognition:      [x] Alert  [x] Awake  [x] Oriented  [x] Person  [x] Place [x] Time      [] drowsy  [] tired  [] lethargic  [] distractable     Attention/Concentration:   [x] Attentive  [] Distracted        Memory Recent and Remote: [] Intact   [] Impaired [] Partially Impaired     Language: [] Able to recognize and name objects          [] Unable to recognize and name Objects    Fund of Knowledge:  [] Poor [x]  Fair  [] Good    Speech: [] Normal  [] Soft  [] Slow  [] Fast [] Pressured            [] Loud [x] Dysarthria  [] Incoherent       Appearance: [] Well Groomed  [] Casual Dressed  [] Unkept  [x] Disheveled          [] Normal weight  [] Thin  [x] Overweight  [] Obese           Attitude: [x] Positive  [] Hostile  [] Demanding  [] Guarded  [] Defensive         [x] Cooperative  []  Uncooperative      Behavior:  [x] Normal Gait  [] Abnormal Gait [] Walks with Assistance  [] Supa Mcmahon     [] [facility]  [] In Hospital Bed  [] Sitting in Chair    Muscle-Skeletal:  [x] Normal Muscle Tone [] Muscle Atrophy            [] Abnormal Muscle Movement     Eye Contact:  [] Good eye contact  [] Intermittent Eye Contact  [] Poor Eye Contact    [] Excessive Eye Contact   [] Intrusive Eye Contact    Mood: [x] Depressed  [x] Anxious  [] Irritated  [] Euthymic   [] Angry [] Restless                   [] Apathetic    Affect:  [x] Congruent  [] Incongruent  [] Labile  [] Constricted  [x] Flat  [x] Bizarre                     [] Heightened    [] Exaggerated      Thought Process and Association:  [] Logical [x] Illogical       [] Linear and Goal Directed  [] Tangential  [x] Circumstantial     Thought Content:  [] Denies [x] Endorses [x] Suicidal [x] Homicidal  [x] Delusional      [x] Paranoid  [] Somatic  [] Grandiose    Perception: []  None  [x] Auditory   [x] Visual  [] tactile   [] olfactory  [] Illusions         Insight: [] Intact  [] Fair  [x] Limited    Judgement:  [] Intact  [] Fair  [x] Limited        ASSESSMENT    Patient Active Problem List   Diagnosis    Iritis of right eye    Severe major depression with psychotic features (HCC)    Bipolar 1 disorder, depressed, severe (HCC)    Depression with suicidal ideation    Facial droop    Bell's palsy    Bipolar 1 disorder (HCC)    Severe major depression without psychotic features (HCC)    Severe episode of recurrent major depressive disorder, without psychotic features ([facility]     Recommendations and plan of treatment:  1- admit to inpatient unit  2- Unit [facility]   3- Medication Management  4- Group therapy and one on one. 5- Routine precautions      Signed:  Geovani Chavez  6/9/2019  5:42 AM      I saw and examined the patient and I agree with the above documentation. I edited the note I edited the note